# Patient Record
Sex: MALE | Race: WHITE
[De-identification: names, ages, dates, MRNs, and addresses within clinical notes are randomized per-mention and may not be internally consistent; named-entity substitution may affect disease eponyms.]

---

## 2017-11-20 ENCOUNTER — HOSPITAL ENCOUNTER (EMERGENCY)
Dept: HOSPITAL 75 - ER | Age: 42
Discharge: HOME | End: 2017-11-20
Payer: MEDICAID

## 2017-11-20 VITALS — WEIGHT: 235 LBS | HEIGHT: 69 IN | BODY MASS INDEX: 34.8 KG/M2

## 2017-11-20 VITALS — SYSTOLIC BLOOD PRESSURE: 122 MMHG | DIASTOLIC BLOOD PRESSURE: 90 MMHG

## 2017-11-20 DIAGNOSIS — I10: ICD-10-CM

## 2017-11-20 DIAGNOSIS — F41.9: ICD-10-CM

## 2017-11-20 DIAGNOSIS — J44.9: ICD-10-CM

## 2017-11-20 DIAGNOSIS — R10.84: ICD-10-CM

## 2017-11-20 DIAGNOSIS — K21.9: ICD-10-CM

## 2017-11-20 DIAGNOSIS — Z87.19: ICD-10-CM

## 2017-11-20 DIAGNOSIS — R10.13: Primary | ICD-10-CM

## 2017-11-20 DIAGNOSIS — F32.9: ICD-10-CM

## 2017-11-20 DIAGNOSIS — E86.9: ICD-10-CM

## 2017-11-20 DIAGNOSIS — E11.9: ICD-10-CM

## 2017-11-20 DIAGNOSIS — F17.210: ICD-10-CM

## 2017-11-20 DIAGNOSIS — Z82.49: ICD-10-CM

## 2017-11-20 LAB
ALBUMIN SERPL-MCNC: 4.2 GM/DL (ref 3.2–4.5)
ALT SERPL-CCNC: 29 U/L (ref 0–55)
ANION GAP SERPL CALC-SCNC: 13 MMOL/L (ref 5–14)
AST SERPL-CCNC: 20 U/L (ref 5–34)
BASOPHILS # BLD AUTO: 0 10^3/UL (ref 0–0.1)
BASOPHILS NFR BLD AUTO: 0 % (ref 0–10)
BASOPHILS NFR BLD MANUAL: 1 %
BILIRUB SERPL-MCNC: 0.4 MG/DL (ref 0.1–1)
BILIRUB UR QL STRIP: NEGATIVE
BUN SERPL-MCNC: 16 MG/DL (ref 7–18)
BUN/CREAT SERPL: 18
CALCIUM SERPL-MCNC: 9.9 MG/DL (ref 8.5–10.1)
CHLORIDE SERPL-SCNC: 98 MMOL/L (ref 98–107)
CO2 SERPL-SCNC: 26 MMOL/L (ref 21–32)
CREAT SERPL-MCNC: 0.91 MG/DL (ref 0.6–1.3)
EOSINOPHIL # BLD AUTO: 0.2 10^3/UL (ref 0–0.3)
EOSINOPHIL NFR BLD AUTO: 1 % (ref 0–10)
EOSINOPHIL NFR BLD MANUAL: 1 %
ERYTHROCYTE [DISTWIDTH] IN BLOOD BY AUTOMATED COUNT: 13.6 % (ref 10–14.5)
GFR SERPLBLD BASED ON 1.73 SQ M-ARVRAT: > 60 ML/MIN
GLUCOSE SERPL-MCNC: 106 MG/DL (ref 70–105)
KETONES UR QL STRIP: NEGATIVE
LEUKOCYTE ESTERASE UR QL STRIP: NEGATIVE
LIPASE SERPL-CCNC: 16 U/L (ref 8–78)
LYMPHOCYTES # BLD AUTO: 3.6 X 10^3 (ref 1–4)
LYMPHOCYTES NFR BLD AUTO: 23 % (ref 12–44)
MAGNESIUM SERPL-MCNC: 1.7 MG/DL (ref 1.8–2.4)
MCH RBC QN AUTO: 29 PG (ref 25–34)
MCHC RBC AUTO-ENTMCNC: 34 G/DL (ref 32–36)
MCV RBC AUTO: 85 FL (ref 80–99)
MONOCYTES # BLD AUTO: 1.1 X 10^3 (ref 0–1)
MONOCYTES NFR BLD AUTO: 7 % (ref 0–12)
NEUTROPHILS # BLD AUTO: 10.9 X 10^3 (ref 1.8–7.8)
NEUTROPHILS NFR BLD AUTO: 69 % (ref 42–75)
NEUTS BAND NFR BLD MANUAL: 70 %
NITRITE UR QL STRIP: NEGATIVE
PH UR STRIP: 6 [PH] (ref 5–9)
PLATELET # BLD: 418 10^3/UL (ref 130–400)
PMV BLD AUTO: 9.9 FL (ref 7.4–10.4)
POTASSIUM SERPL-SCNC: 3.9 MMOL/L (ref 3.6–5)
PROT SERPL-MCNC: 7.9 GM/DL (ref 6.4–8.2)
PROT UR QL STRIP: NEGATIVE
RBC # BLD AUTO: 6.03 10^6/UL (ref 4.35–5.85)
SODIUM SERPL-SCNC: 137 MMOL/L (ref 135–145)
SP GR UR STRIP: 1.01 (ref 1.02–1.02)
SQUAMOUS #/AREA URNS HPF: (no result) /HPF
UROBILINOGEN UR-MCNC: NORMAL MG/DL
VARIANT LYMPHS NFR BLD MANUAL: 12 %
VARIANT LYMPHS NFR BLD MANUAL: 6 %
WBC # BLD AUTO: 15.8 10^3/UL (ref 4.3–11)
WBC #/AREA URNS HPF: (no result) /HPF

## 2017-11-20 PROCEDURE — 85027 COMPLETE CBC AUTOMATED: CPT

## 2017-11-20 PROCEDURE — 96374 THER/PROPH/DIAG INJ IV PUSH: CPT

## 2017-11-20 PROCEDURE — 85007 BL SMEAR W/DIFF WBC COUNT: CPT

## 2017-11-20 PROCEDURE — 81000 URINALYSIS NONAUTO W/SCOPE: CPT

## 2017-11-20 PROCEDURE — 83690 ASSAY OF LIPASE: CPT

## 2017-11-20 PROCEDURE — 83735 ASSAY OF MAGNESIUM: CPT

## 2017-11-20 PROCEDURE — 36415 COLL VENOUS BLD VENIPUNCTURE: CPT

## 2017-11-20 PROCEDURE — 74177 CT ABD & PELVIS W/CONTRAST: CPT

## 2017-11-20 PROCEDURE — 80053 COMPREHEN METABOLIC PANEL: CPT

## 2017-11-20 PROCEDURE — 96375 TX/PRO/DX INJ NEW DRUG ADDON: CPT

## 2017-11-20 PROCEDURE — 80306 DRUG TEST PRSMV INSTRMNT: CPT

## 2017-11-20 NOTE — XMS REPORT
[facility]

 Created on: 2015



Dago Morrow

External Reference #: [id number]

: 1975

Sex: Male



Demographics







 Address  [address]

[place]  [address]

 

 Home Phone  [phone]

 

 Preferred Language  Unknown

 

 Marital Status  Unknown

 

 Christian Affiliation  Unknown

 

 Race  Unreported/Refused to Report

 

 Ethnic Group  Not  or 





Author







 Author  LILIANA CHURCHILL

 

 Organization  [employer]

 

 Address  Unknown

 

 Phone  Unavailable







Care Team Providers







 Care Team Member Name  Role  Phone

 

 LILIANA CHURCHILL  CP  Unavailable



                                                                



Allergies

          No Known Allergies                                                   
                                     



Problems

          





 Problem Type  Condition  Code  Onset Dates  Condition Status

 

 Problem  Essential hypertension, benign  401.1     Active

 

 Problem  Abdominal pain, generalized  789.07     Active

 

 Problem  Hematuria, unspecified  599.70     Active

 

 Problem  Other and unspecified bipolar disorders  296.89     Active

 

 Problem  Other seborrheic keratosis  702.19     Active

 

 Problem  Lumbago  724.2     Active

 

 Problem  Family history of other cardiovascular diseases  V17.49     Active

 

 Problem  Asthma, unspecified, unspecified status  493.90     Active

 

 Problem  Pain in joint, lower leg  719.46     Active

 

 Problem  Unspecified episodic mood disorder  296.90     Active

 

 Problem  Unspecified gastritis and gastroduodenitis without mention of 
hemorrhage  535.50     Active

 

 Problem  Family history of diabetes mellitus  V18.0     Active



                                                                               
                                                                               
                                        



Medications

          





 Medication  Code System  Code  Instructions  Start Date  End Date  Status  
Dosage

 

 Lorazepam  NDC  [address]-86  0.5 MG Orally Once a day  Aug 05, 2015        1 
tablet as needed



                                                                              



Results

          No Known Results                                                     
               



Summary Purpose

          [employer] Submission

## 2017-11-20 NOTE — XMS REPORT
[facility]

 Created on: 2017



Dago Morrow

External Reference #: [id number]

: 1975

Sex: Male



Demographics







 Address  [address]  [address]

 

 Preferred Language  Unknown

 

 Marital Status  Unknown

 

 Scientologist Affiliation  Unknown

 

 Race  Unknown

 

 Ethnic Group  Unknown





Author







 Author  LILIANA CHURCHILL

 

 Organization  [facility]

 

 Address  [address] [place]  [address]



 

 Phone  [phone]







Care Team Providers







 Care Team Member Name  Role  Phone

 

 ZHAOLILIANA  Unavailable  [phone]







PROBLEMS







 Type  Condition  ICD9-CM Code  SNL26-JN Code  Onset Dates  Condition Status  
SNOMED Code

 

 Problem  Mood disorder     F39     Active  12726962

 

 Problem  Anxiety disorder, unspecified     F41.9     Active  017450015

 

 Problem  Other chronic pain     G89.29     Active  57781813

 

 Problem  Type 2 diabetes mellitus with hyperglycemia     E11.65     Active  
982456673247627

 

 Problem  Type 2 diabetes mellitus with diabetic polyneuropathy     E11.42     
Active  34985518

 

 Problem  Uncontrolled type 2 diabetes mellitus without complication, without 
long-term current use of insulin     E11.65     Active  620039809

 

 Problem  Diabetic polyneuropathy associated with type 2 diabetes mellitus     
E11.42     Active  07806028

 

 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  
733404605

 

 Problem  Chronic obstructive pulmonary disease, unspecified COPD type     
J44.9     Active  77223429

 

 Problem  Anxiety     F41.9     Active  55954644

 

 Problem  Social phobia     F40.10     Active  01133258

 

 Problem  Major depression, melancholic type     F32.9     Active  031524705







ALLERGIES

Unknown Allergies



SOCIAL HISTORY

No smoking Hx information available



PLAN OF CARE





VITAL SIGNS





MEDICATIONS







 Medication  Instructions  Dosage  Frequency  Start Date  End Date  Duration  
Status

 

 Xanax 1 MG  Orally 2 times a day  1 tablet  12h  15 Aug, 2016     28 days  
Active







RESULTS

No Results



PROCEDURES

No Known procedures



IMMUNIZATIONS

No Known Immunizations

## 2017-11-20 NOTE — XMS REPORT
[facility]

 Created on: 2016



Dago Morrow

External Reference #: [id number]

: 1975

Sex: Male



Demographics







 Address  [address]

[place]  [address]

 

 Home Phone  [phone]

 

 Preferred Language  Unknown

 

 Marital Status  Unknown

 

 Anglican Affiliation  Unknown

 

 Race  White

 

 Ethnic Group  Not  or 





Author







 Author  LILIANA CHURCHILL

 

 Organization  [employer]

 

 Address  Unknown

 

 Phone  Unavailable







Care Team Providers







 Care Team Member Name  Role  Phone

 

 LILIANA CHURCHILL  CP  Unavailable



                                                                



Allergies

          No Known Allergies                                                   
                                     



Problems

          





 Problem Type  Condition  Code  Onset Dates  Condition Status

 

 Problem  Pain in joint, lower leg  719.46     Active

 

 Problem  Unspecified gastritis and gastroduodenitis without mention of 
hemorrhage  535.50     Active

 

 Problem  Family history of diabetes mellitus  V18.0     Active

 

 Problem  Uncontrolled type 2 diabetes mellitus without complication, without 
long-term current use of insulin  E11.65     Active

 

 Problem  Type 2 diabetes mellitus with diabetic polyneuropathy  E11.42     
Active

 

 Problem  Mood disorder  F39     Active

 

 Problem  Lumbago  724.2     Active

 

 Problem  Family history of other cardiovascular diseases  V17.49     Active

 

 Problem  Type 2 diabetes mellitus with hyperglycemia  E11.65     Active

 

 Problem  Asthma, unspecified, unspecified status  493.90     Active

 

 Problem  Essential hypertension, benign  401.1     Active

 

 Problem  Hematuria, unspecified  599.70     Active

 

 Problem  Other and unspecified bipolar disorders  296.89     Active

 

 Problem  Abdominal pain, generalized  789.07     Active

 

 Problem  Other seborrheic keratosis  702.19     Active

 

 Problem  Unspecified episodic mood disorder  296.90     Active



                                                                               
                                                                               
                                                                                



Medications

          





 Medication  Code System  Code  Instructions  Start Date  End Date  Status  
Dosage

 

 Xanax  NDC  [address]-65  1 MG Orally 2 times a day  Aug 15, 2016        1 
tablet



                                                                              



Results

          No Known Results                                                     
               



Summary Purpose

          [employer] Submission

## 2017-11-20 NOTE — XMS REPORT
[facility]

 Created on: 2016



Dago Morrow

External Reference #: [id number]

: 1975

Sex: Male



Demographics







 Address  [address]

[place]  [address]

 

 Home Phone  [phone]

 

 Preferred Language  Unknown

 

 Marital Status  Unknown

 

 Zoroastrianism Affiliation  Unknown

 

 Race  Unreported/Refused to Report

 

 Ethnic Group  Not  or 





Author







 Author  LILIANA CHURCHILL

 

 Organization  [employer]

 

 Address  Unknown

 

 Phone  Unavailable







Care Team Providers







 Care Team Member Name  Role  Phone

 

 LILIANA CHURCHILL  CP  Unavailable



                                                                



Allergies

          No Known Allergies                                                   
                                     



Problems

          





 Problem Type  Condition  Code  Onset Dates  Condition Status

 

 Problem  Essential hypertension, benign  401.1     Active

 

 Problem  Abdominal pain, generalized  789.07     Active

 

 Problem  Hematuria, unspecified  599.70     Active

 

 Problem  Other and unspecified bipolar disorders  296.89     Active

 

 Problem  Other seborrheic keratosis  702.19     Active

 

 Problem  Lumbago  724.2     Active

 

 Problem  Family history of other cardiovascular diseases  V17.49     Active

 

 Problem  Asthma, unspecified, unspecified status  493.90     Active

 

 Problem  Pain in joint, lower leg  719.46     Active

 

 Problem  Unspecified episodic mood disorder  296.90     Active

 

 Problem  Unspecified gastritis and gastroduodenitis without mention of 
hemorrhage  535.50     Active

 

 Problem  Family history of diabetes mellitus  V18.0     Active



                                                                               
                                                                               
                                        



Medications

          No Known Medications                                                 
                             



Results

          No Known Results                                                     
               



Summary Purpose

          [employer] Submission

## 2017-11-20 NOTE — XMS REPORT
[facility]

 Created on: 2016



Dago Morrow

External Reference #: [id number]

: 1975

Sex: Male



Demographics







 Address  [address]

[place]  [address]

 

 Home Phone  [phone]

 

 Preferred Language  Unknown

 

 Marital Status  Unknown

 

 Religion Affiliation  Unknown

 

 Race  White

 

 Ethnic Group  Not  or 





Author







 Author  LILIANA CHURCHILL

 

 [facility]  [employer]

 

 Address  Unknown

 

 Phone  Unavailable







Care Team Providers







 Care Team Member Name  Role  Phone

 

 LILIANA CHURCHILL  CP  Unavailable



                                                                



Allergies

          No Known Allergies                                                   
                                     



Problems

          





 Problem Type  Condition  Code  Onset Dates  Condition Status

 

 Problem  Pain in joint, lower leg  719.46     Active

 

 Problem  Unspecified gastritis and gastroduodenitis without mention of 
hemorrhage  535.50     Active

 

 Problem  Family history of diabetes mellitus  V18.0     Active

 

 Problem  Uncontrolled type 2 diabetes mellitus without complication, without 
long-term current use of insulin  E11.65     Active

 

 Problem  Type 2 diabetes mellitus with diabetic polyneuropathy  E11.42     
Active

 

 Problem  Mood disorder  F39     Active

 

 Problem  Lumbago  724.2     Active

 

 Problem  Family history of other cardiovascular diseases  V17.49     Active

 

 Problem  Type 2 diabetes mellitus with hyperglycemia  E11.65     Active

 

 Problem  Asthma, unspecified, unspecified status  493.90     Active

 

 Assessment  Seizures  R56.9     Active

 

 Problem  Essential hypertension, benign  401.1     Active

 

 Problem  Hematuria, unspecified  599.70     Active

 

 Problem  Other and unspecified bipolar disorders  296.89     Active

 

 Problem  Abdominal pain, generalized  789.07     Active

 

 Problem  Other seborrheic keratosis  702.19     Active

 

 Problem  Unspecified episodic mood disorder  296.90     Active



                                                                               
                                                                               
                                                                               
           



Medications

          No Known Medications                                                 
                                       



Procedures

          





 Procedure  Coding System  Code  Date

 

 VENIPUNCT, ROUTINE*  CPT-4  42277  Oct 06, 2016

 

 LAB NOT BILLED BY [facility]  CPT-4  NOBLL  Oct 06, 2016



                                                                              



Results

          





 Name  Result  Date  Reference Range  Unit  Abnormality Flag

 

 TSH               

 

 ----TSH  2.060  [id number]  0.450-4.500   uIU/mL   

 

 CBC               

 

 ----Basos  1  62809878     %   

 

 ----MCV  90  34664633  79-97   fL   

 

 ----Hematocrit  41.4  90185713  37.5-51.0   %   

 

 ----Eos  5  83895631     %   

 

 ----MCHC  32.6  14386232  31.5-35.7   g/dL   

 

 ----Monocytes  7  07023837     %   

 

 ----MCH  29.5  35636229  26.6-33.0   pg   

 

 ----Lymphs  26  88129404     %   

 

 ----Eos (Absolute)  0.5  17456738  0.0-0.4   x10E3/uL  H

 

 ----WBC  9.1  17452765  3.4-10.8   x10E3/uL   

 

 ----Monocytes(Absolute)  0.7  92640994  0.1-0.9   x10E3/uL   

 

 ----Lymphs (Absolute)  2.3  04895151  0.7-3.1   x10E3/uL   

 

 ----Hemoglobin  13.5  27151958  12.6-17.7   g/dL   

 

 ----Neutrophils (Absolute)  5.5  10627920  1.4-7.0   x10E3/uL   

 

 ----RBC  4.58  44698771  4.14-5.80   x10E6/uL   

 

 ----Immature Grans (Abs)  0.0  48242083  0.0-0.1   x10E3/uL   

 

 ----Immature Granulocytes  0  25683310     %   

 

 ----Neutrophils  61  63995141     %   

 

 ----Baso (Absolute)  0.1  59078203  0.0-0.2   x10E3/uL   

 

 ----RDW  13.6  69589352  12.3-15.4   %   

 

 ----Platelets  352  16779641  150-379   x10E3/uL   

 

 ROUTINE VENIPUNCTURE               

 

 MAGNESIUM, SERUM               

 

 ----Magnesium, Serum  1.7  47815698  1.6-2.3   mg/dL   

 

 CMP               

 

 ----Potassium, Serum  4.3  78661599  3.5-5.2   mmol/L   

 

 ----Sodium, Serum  136  90058654  134-144   mmol/L   

 

 ----BUN/Creatinine Ratio  20  71200985  9-20       

 

 ----eGFR If Africn Am  131  79077180      >59   mL/min/1.73   

 

 ----eGFR If NonAfricn Am  113  67877119      >59   mL/min/1.73   

 

 ----Creatinine, Serum  0.76  49133590  0.76-1.27   mg/dL   

 

 ----BUN  15  2016  6-24   mg/dL   

 

 ----Glucose, Serum  187  45934153  65-99   mg/dL  H

 

 ----AST (SGOT)  60  10543122  0-40   IU/L  H

 

 ----Globulin, Total  2.4  49558225  1.5-4.5   g/dL   

 

 ----ALT (SGPT)  96  40027022  0-44   IU/L  H

 

 ----A/G Ratio  1.7  18274397  1.1-2.5       

 

 ----Bilirubin, Total  0.3  77649411  0.0-1.2   mg/dL   

 

 ----Alkaline Phosphatase, S  70  44280331     IU/L   

 

 ----Carbon Dioxide, Total  25  63026048  18-29   mmol/L   

 

 ----Calcium, Serum  9.1  98092963  8.7-10.2   mg/dL   

 

 ----Protein, Total, Serum  6.5  11031406  6.0-8.5   g/dL   

 

 ----Albumin, Serum  4.1  77117890  3.5-5.5   g/dL   

 

 ----Chloride, Serum  93  99463631     mmol/L  L



                                                                               
                             



Summary Purpose

          eClinicalWorks Submission

## 2017-11-20 NOTE — XMS REPORT
[facility]

 Created on: 2017



Dago Morrow

External Reference #: [id number]

: 1975

Sex: Male



Demographics







 Address  [address] [place] [address]  [address]

 

 Preferred Language  Unknown

 

 Marital Status  Unknown

 

 Taoist Affiliation  Unknown

 

 Race  Unknown

 

 Ethnic Group  Unknown





Author







 Author  LILIANA CHURCHILL

 

 Organization  [facility]

 

 Address  [address] [place]  [address]



 

 Phone  [phone]







Care Team Providers







 Care Team Member Name  Role  Phone

 

 LILIANA CHURCHILL  Unavailable  [phone]







PROBLEMS







 Type  Condition  ICD9-CM Code  OWR81-NP Code  Onset Dates  Condition Status  
SNOMED Code

 

 Problem  Unspecified episodic mood disorder  296.90        Active  248863334

 

 Problem  Family history of diabetes mellitus  V18.0        Active  198608685

 

 Problem  Pain in joint, lower leg  719.46        Active  802390378

 

 Problem  Type 2 diabetes mellitus with diabetic polyneuropathy     E11.42     
Active  73999777

 

 Problem  Type 2 diabetes mellitus with hyperglycemia     E11.65     Active  
335896118408938

 

 Problem  Family history of other cardiovascular diseases  V17.49        Active
  623783241

 

 Problem  Unspecified gastritis and gastroduodenitis without mention of 
hemorrhage  535.50        Active  578235830

 

 Problem  Asthma, unspecified, unspecified status  493.90        Active  
26842718

 

 Problem  Lumbago  724.2        Active  434548731

 

 Assessment  Diabetic polyneuropathy associated with type 2 diabetes mellitus  
   E11.42  15 Sep, 2016  Active  02333324

 

 Problem  Other seborrheic keratosis  702.19        Active  646834769

 

 Problem  Essential hypertension, benign  401.1        Active  2850362

 

 Assessment  Carpal tunnel syndrome, right     G56.01  15 Sep, 2016  Active  
70930758

 

 Problem  Hematuria, unspecified  599.70        Active  36732827

 

 Problem  Other and unspecified bipolar disorders  296.89        Active  
58841525

 

 Problem  Abdominal pain, generalized  789.07        Active  798360330







ALLERGIES







 Substance  Reaction  Event Type  Date  Status

 

 N.K.D.A.  Unknown  Non Drug Allergy  15 Sep, 2016  Unknown







SOCIAL HISTORY

No smoking Hx information available



PLAN OF CARE





VITAL SIGNS







 Height  72 in  2016-09-15

 

 Weight  321.1 lbs  2016-09-15

 

 Heart Rate  88 bpm  2016-09-15

 

 Respiratory Rate  24   2016-09-15

 

 BMI  43.54 kg/m2  2016-09-15

 

 Blood pressure systolic  122 mmHg  2016-09-15

 

 Blood pressure diastolic  78 mmHg  2016-09-15







MEDICATIONS







 Medication  Instructions  Dosage  Frequency  Start Date  End Date  Duration  
Status

 

 MiraLax 17 gm/dose  Orally Once a day  1 capful mixed with 8 ounces of fluid  
24h  16 Dec, 2015        Active

 

 Promethazine HCl 25 MG  Orally 3 times a day PRN  1 tablet as needed           
10  Active

 

 Sucralfate 1 GM     TAKE ONE TABELT BY MOUTH  BEFORE MEALS AND AT BEDTIME     
      30  Active

 

 Cyclobenzaprine HCl 10 mg  Orally 2 times a day  1 tablet  12h        30  
Active

 

 Singulair 10 mg  Orally Once a day  1 tablet at bedtime  24h        90  Active

 

 Accu-Chek Mehnaz 1  In Vitro 2 times a day  test blood sugar  12h  
        Active

 

 Neurontin 400 MG  Orally Three times a day  1 capsule  8h        30  Active

 

 Protonix 40 mg  Orally Once a day  1 tablet  24h        90  Active

 

 Metformin HCl 1000 MG  Orally Twice a day  1 tablet with meals  12h       30 day(s)  Active

 

 Prozac 20 MG  Orally Once a day  1 capsule  24h        30  Active

 

 [place]ier 7.5-325 MG  Orally 3 times a day  1 tablet as needed  8h  27 Mar, 2015  
      Active

 

 Diclofenac Sodium 75 MG  Orally Twice a day  1 tablet  12h        90  Active

 

 Albuterol Sulfate 90 mcg/actuation     inhale 2 puffs  6h  01 Oct, 2014        
Active

 

 Lisinopril-Hydrochlorothiazide 20-25 MG     1 tablet  24h        90  Active

 

 Zofran 8 MG  Orally Once a day  1 tablet  24h        30  Active

 

 Xanax 0.5 MG  Orally 2 times a day  1 tablet  12h  15 Aug, 2016        Active







RESULTS

No Results



PROCEDURES







 Procedure  Date Ordered  Related Diagnosis  Body Site

 

 Office Visit, Est Pt., Level 3  Sept 15, 2016      







IMMUNIZATIONS

No Known Immunizations

## 2017-11-20 NOTE — XMS REPORT
[facility]

 Created on: 10/03/2017



Dago Morrow

External Reference #: [id number]

: 1975

Sex: Male



Demographics







 Address  [address]

[place]  [address]

 

 Preferred Language  Unknown

 

 Marital Status  Unknown

 

 Gnosticism Affiliation  Unknown

 

 Race  Unknown

 

 Ethnic Group  Unknown





Author







 Author  LILIANA CHURCHILL

 

 Organization  [facility]

 

 Address  [address] [place]  [address]



 

 Phone  [phone]







Care Team Providers







 Care Team Member Name  Role  Phone

 

 LILIANA CHURCHILL  Unavailable  [phone]







PROBLEMS







 Type  Condition  ICD9-CM Code  EEL08-GX Code  Onset Dates  Condition Status  
SNOMED Code

 

 Problem  Mood disorder     F39     Active  80387906

 

 Problem  Anxiety disorder, unspecified     F41.9     Active  385875644

 

 Problem  Other chronic pain     G89.29     Active  43156405

 

 Problem  Type 2 diabetes mellitus with hyperglycemia     E11.65     Active  
596124254172065

 

 Problem  Type 2 diabetes mellitus with diabetic polyneuropathy     E11.42     
Active  02698033

 

 Problem  Uncontrolled type 2 diabetes mellitus without complication, without 
long-term current use of insulin     E11.65     Active  439455493

 

 Problem  Diabetic polyneuropathy associated with type 2 diabetes mellitus     
E11.42     Active  68013409

 

 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  
502293950

 

 Problem  Chronic obstructive pulmonary disease, unspecified COPD type     
J44.9     Active  91221914

 

 Problem  Anxiety     F41.9     Active  88127586

 

 Problem  Social phobia     F40.10     Active  14840161

 

 Problem  Major depression, melancholic type     F32.9     Active  563233432







ALLERGIES

No Information



SOCIAL HISTORY

Never Assessed



PLAN OF CARE





VITAL SIGNS





MEDICATIONS







 Medication  Instructions  Dosage  Frequency  Start Date  End Date  Duration  
Status

 

 Xanax 1 MG  Orally 2 times a day  1 tablet  12h  15 Aug, 2016     28 days  
Active







RESULTS

No Results



PROCEDURES

No Known procedures



IMMUNIZATIONS

No Known Immunizations



MEDICAL (GENERAL) HISTORY







 Type  Description  Date

 

 Medical History  hypertension   

 

 Medical History  asthma   

 

 Medical History  depression and mood disorder   

 

 Medical History  chronic back pain   

 

 Surgical History  angiogram(heart cath)  2016

 

 Hospitalization History  pancreatitis  

 

 Hospitalization History  ER visit for abdominal crampintg  2015

## 2017-11-20 NOTE — XMS REPORT
[facility]

 Created on: 2015



Dago Morrow

External Reference #: [id number]

: 1975

Sex: Male



Demographics







 Address  [address]

[place]  [address]

 

 Home Phone  [phone]

 

 Preferred Language  Unknown

 

 Marital Status  Unknown

 

 [employer] Affiliation  Unknown

 

 Race  Unreported/Refused to Report

 

 Ethnic Group  Not  or 





Author







 Author  LILIANA CHURCHILL

 

 Organization  [employer]

 

 Address  Unknown

 

 Phone  Unavailable







Care Team Providers







 Care Team Member Name  Role  Phone

 

 LILIANA CHURCHILL  CP  Unavailable



                                                                



Allergies

          No Known Allergies                                                   
                                     



Problems

          





 Problem Type  Condition  ICD-9 Code  Onset Dates  Condition Status

 

 Problem  Essential hypertension, benign  401.1     Active

 

 Problem  Abdominal pain, generalized  789.07     Active

 

 Problem  Hematuria, unspecified  599.70     Active

 

 Problem  Other and unspecified bipolar disorders  296.89     Active

 

 Problem  Other seborrheic keratosis  702.19     Active

 

 Problem  Lumbago  724.2     Active

 

 Problem  Family history of other cardiovascular diseases  V17.49     Active

 

 Problem  Asthma, unspecified, unspecified status  493.90     Active

 

 Problem  Pain in joint, lower leg  719.46     Active

 

 Problem  Unspecified episodic mood disorder  296.90     Active

 

 Problem  Unspecified gastritis and gastroduodenitis without mention of 
hemorrhage  535.50     Active

 

 Problem  Family history of diabetes mellitus  V18.0     Active



                                                                               
                                                                               
                                        



Medications

          





 Medication  Code System  Code  Instructions  Start Date  End Date  Status  
Dosage

 

 Prozac  NDC  [address]-20  10 MG Orally Once a day  May 01, 2015        1 
capsule



                                                                              



Results

          No Known Results                                                     
               



Summary Purpose

          [employer] Submission

## 2017-11-20 NOTE — XMS REPORT
[facility]

 Created on: 2017



Dago Morrow

External Reference #: [id number]

: 1975

Sex: Male



Demographics







 Address  [address]  [address]

 

 Preferred Language  Unknown

 

 Marital Status  Unknown

 

 Lutheran Affiliation  Unknown

 

 Race  Unknown

 

 Ethnic Group  Unknown





Author







 Author  LILIANA CHURCHILL

 

 Organization  [facility]

 

 Address  [address] [place]  [address]



 

 Phone  [phone]







Care Team Providers







 Care Team Member Name  Role  Phone

 

 LILIANA CHURCHILL  Unavailable  [phone]







PROBLEMS







 Type  Condition  ICD9-CM Code  CZQ57-AH Code  Onset Dates  Condition Status  
SNOMED Code

 

 Problem  Mood disorder     F39     Active  64952450

 

 Problem  Anxiety disorder, unspecified     F41.9     Active  315268824

 

 Problem  Other chronic pain     G89.29     Active  16242493

 

 Problem  Type 2 diabetes mellitus with hyperglycemia     E11.65     Active  
747657105100902

 

 Problem  Type 2 diabetes mellitus with diabetic polyneuropathy     E11.42     
Active  08487693

 

 Problem  Uncontrolled type 2 diabetes mellitus without complication, without 
long-term current use of insulin     E11.65     Active  995454563

 

 Problem  Diabetic polyneuropathy associated with type 2 diabetes mellitus     
E11.42     Active  88158474

 

 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  
914225452

 

 Problem  Chronic obstructive pulmonary disease, unspecified COPD type     
J44.9     Active  11761193

 

 Problem  Anxiety     F41.9     Active  67674507

 

 Problem  Social phobia     F40.10     Active  69615289

 

 Problem  Major depression, melancholic type     F32.9     Active  061781515







ALLERGIES

No Known Allergies



SOCIAL HISTORY

Never Assessed



PLAN OF CARE







 Activity  Details









  









 Follow Up  4 Weeks Reason:mood disorder







VITAL SIGNS







 Height  72 in  2017

 

 Weight  334.0 lbs  2017

 

 Temperature  97.6 degrees Fahrenheit  2017

 

 Heart Rate  104 bpm  2017

 

 Respiratory Rate  24   2017

 

 BMI  45.29 kg/m2  2017

 

 Blood pressure systolic  129 mmHg  2017

 

 Blood pressure diastolic  72 mmHg  2017







MEDICATIONS







 Medication  Instructions  Dosage  Frequency  Start Date  End Date  Duration  
Status

 

 Albuterol Sulfate 90 mcg/actuation     inhale 2 puffs  6h  01 Oct, 2014        
Active

 

 Metformin HCl 1000 MG  Orally Twice a day  1 tablet with meals  12h        90  
Active

 

 True Metrix Meter glucometer     test blood sugar  12h  15 Sep, 2016        
Active

 

 Lisinopril-Hydrochlorothiazide 20-25 MG     1 tablet  24h        90  Active

 

 Dulera 200-5 MCG/ACT  Inhalation Twice a day  2 puffs  12h        
  Active

 

 Accu-Chek Mehnaz 1  In Vitro 2 times a day  test blood sugar  12h  
        Active

 

 Diclofenac Sodium 75 MG  Orally Twice a day  1 tablet  12h        90  Active

 

 Protonix 40 mg  Orally Once a day  1 tablet  24h        90  Active

 

 Pregabalin 75 MG  Orally Twice a day  1 capsule  12h       30 days
  Active

 

 Keflex 500 mg  Orally 4 times a day  1 capsule  6h  
  10 day(s)  Active

 

 True Metrix Blood Glucose Test -  In Vitro 2 times a day  test blood sugar  
12h  15 Sep, 2016     90 days  Active

 

 GlipiZIDE 5 mg  Orally 2 times a day  1 tablet  12h        30  Active

 

 Xanax 1 MG  Orally 2 times a day  1 tablet  12h  15 Aug, 2016     23 days  
Active

 

 Prozac 40 MG  Orally Once a day  1 capsule  24h        30 days  Active

 

 Prozac 10 mg  Orally Once a day, add to 40 mg  1 capsule in the morning             Active

 

 Treece 7.5-325 MG  Orally 3 times a day  1 tablet as needed  8h  31 May, 2017  
   28 days  Active

 

 HydrOXYzine HCl 25 MG  Orally every 8 hrs  1 tablet as needed  8h        30  
Active

 

 Singulair 10 mg  Orally Once a day  1 tablet at bedtime  24h        90  Active

 

 Cyclobenzaprine HCl 10 mg  Orally 2 times a day  1 tablet  12h        30  
Active

 

 Neurontin 800 MG  Orally Three times a day  1 capsule  8h           Active







RESULTS







 Name  Result  Date  Reference Range

 

 A1C (IN HOUSE)     2017   

 

 A1C IN HOUSE  5.8     4.3 - 5.6 %

 

 Previous A1c  6.2      

 

 Lot   [id number]      

 

 Exp date        







PROCEDURES







 Procedure  Date Ordered  Result  Body Site

 

 GLYCATED HEMOGLOBIN TEST  2017      







IMMUNIZATIONS

No Known Immunizations



MEDICAL (GENERAL) HISTORY







 Type  Description  Date

 

 Medical History  hypertension   

 

 Medical History  asthma   

 

 Medical History  depression and mood disorder   

 

 Medical History  chronic back pain   

 

 Surgical History  angiogram(heart cath)  2016

 

 Hospitalization History  pancreatitis  

 

 Hospitalization History  ER visit for abdominal crampintg  2015

## 2017-11-20 NOTE — XMS REPORT
[facility]

 Created on: 2017



Dago Morrow

External Reference #: [id number]

: 1975

Sex: Male



Demographics







 Address  [address]  [address]

 

 Preferred Language  Unknown

 

 Marital Status  Unknown

 

 Tenriism Affiliation  Unknown

 

 Race  Unknown

 

 Ethnic Group  Unknown





Author







 Author  LILIANA CHURCHILL

 

 Organization  [facility]

 

 Address  [address] [place]  [address]



 

 Phone  [phone]







Care Team Providers







 Care Team Member Name  Role  Phone

 

 LILIANA CHURCHILL  Unavailable  [phone]







PROBLEMS







 Type  Condition  ICD9-CM Code  ODX28-OP Code  Onset Dates  Condition Status  
SNOMED Code

 

 Problem  Mood disorder     F39     Active  10388221

 

 Problem  Anxiety disorder, unspecified     F41.9     Active  978125991

 

 Problem  Other chronic pain     G89.29     Active  86034462

 

 Problem  Type 2 diabetes mellitus with hyperglycemia     E11.65     Active  
930976561102874

 

 Problem  Type 2 diabetes mellitus with diabetic polyneuropathy     E11.42     
Active  16924109

 

 Problem  Uncontrolled type 2 diabetes mellitus without complication, without 
long-term current use of insulin     E11.65     Active  634294046

 

 Problem  Diabetic polyneuropathy associated with type 2 diabetes mellitus     
E11.42     Active  31656718

 

 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  
339409666

 

 Problem  Chronic obstructive pulmonary disease, unspecified COPD type     
J44.9     Active  12174896

 

 Problem  Anxiety     F41.9     Active  87208492

 

 Problem  Social phobia     F40.10     Active  94867073

 

 Problem  Major depression, melancholic type     F32.9     Active  094148770







ALLERGIES







 Substance  Reaction  Event Type  Date  Status

 

 N.K.D.A.  Unknown  Non Drug Allergy    Unknown







SOCIAL HISTORY

No smoking Hx information available



PLAN OF CARE





VITAL SIGNS







 Height  72 in  2017

 

 Weight  314.0 lbs  2017

 

 Temperature  98.1 degrees Fahrenheit  2017

 

 Heart Rate  88 bpm  2017

 

 Respiratory Rate  30   2017

 

 BMI  42.58 kg/m2  2017

 

 Blood pressure systolic  125 mmHg  2017

 

 Blood pressure diastolic  83 mmHg  2017







MEDICATIONS







 Medication  Instructions  Dosage  Frequency  Start Date  End Date  Duration  
Status

 

 Amoxicillin 500 MG  Orally every 8 hrs  1 capsule  8h    10 day(s)  Active

 

 GlipiZIDE 5 mg  Orally 2 times a day  1 tablet  12h        30  Active

 

 True Metrix Meter glucometer     test blood sugar  12h  15 Sep, 2016        
Active

 

 HydrOXYzine HCl 25 MG  Orally every 8 hrs  1 tablet as needed  8h       30 day(s)  Active

 

 True Metrix Blood Glucose Test -  In Vitro 2 times a day  as directed  12h  15 
Sep, 2016        Active

 

 Lisinopril-Hydrochlorothiazide 20-25 MG     1 tablet  24h        90  Active

 

 Diclofenac Sodium 75 MG  Orally Twice a day  1 tablet  12h        90  Active

 

 Protonix 40 mg  Orally Once a day  1 tablet  24h        90  Active

 

 Metformin HCl 1000 MG  Orally Twice a day  1 tablet with meals  12h       90 days  Active

 

 Xanax 1 MG  Orally 2 times a day  1 tablet  12h  15 Aug, 2016     28 days  
Active

 

 [place] 7.5-325 MG  Orally 3 times a day  1 tablet as needed  8h  09 2017  
   28 days  Active

 

 Cyclobenzaprine HCl 10 mg  Orally 2 times a day  1 tablet  12h        30  
Active

 

 Singulair 10 mg  Orally Once a day  1 tablet at bedtime  24h        90  Active

 

 Prozac 40 MG  Orally Once a day  1 capsule  24h        30  Active

 

 Accu-Chek Mehnaz 1  In Vitro 2 times a day  test blood sugar  12h  
        Active

 

 Neurontin 800 MG  Orally Three times a day  1 capsule  8h        30  Active

 

 Albuterol Sulfate 90 mcg/actuation     inhale 2 puffs  6h  01 Oct, 2014        
Active

 

 Promethazine HCl 25 MG  Orally 3 times a day PRN  1 tablet as needed           
10  Active







RESULTS







 Name  Result  Date  Reference Range

 

 A1C (IN HOUSE)     2017   

 

 A1C IN HOUSE  6.2     4.3 - 5.6 %

 

 Previous A1c  7.1      

 

 Lot   [id number]      

 

 Exp date        

 

 MICROALBUMIN, URINE (IN HOUSE)     2017   

 

 MICROALBUMIN  Normal      

 

 Lot #  [id number]      

 

 Exp date        

 

 Clarity  Clear      

 

 Color  Yellow      

 

 ALB  10      

 

 CRE  100      

 

 A:C (IN HOUSE)  <30      

 

 Control  +      

 

 Control  -      

 

 Lot #  [id number]      

 

 Exp date  2017      

 

 CMP     2017   

 

 Glucose, Serum  75     65-99

 

 BUN  14     6-24

 

 Creatinine, Serum  0.80     0.76-1.27

 

 eGFR If NonAfricn Am  111         >59

 

 eGFR If Africn Am  128         >59

 

 BUN/Creatinine Ratio  18     9-20

 

 Sodium, Serum  137     134-144

 

 Potassium, Serum  4.4     3.5-5.2

 

 Chloride, Serum  94     

 

 Carbon Dioxide, Total  26     18-29

 

 Calcium, Serum  10.0     8.7-10.2

 

 Protein, Total, Serum  7.4     6.0-8.5

 

 Albumin, Serum  4.4     3.5-5.5

 

 Globulin, Total  3.0     1.5-4.5

 

 A/G Ratio  1.5     1.1-2.5

 

 Bilirubin, Total  <0.2     0.0-1.2

 

 Alkaline Phosphatase, S  78     

 

 AST (SGOT)  27     0-40

 

 ALT (SGPT)  48     0-44

 

 [employer]     2017   

 

 Xray : Knee, Left 1-2 views (IN HOUSE)     2017   







PROCEDURES







 Procedure  Date Ordered  Related Diagnosis  Body Site

 

 GLYCATED HEMOGLOBIN TEST  2017      

 

 MICROALBUMIN, SEMIQUANT  2017      

 

 VENIPUNCT, ROUTINE*  2017      

 

 Office Visit, Est Pt., Level 3  2017      

 

 [facility]  2017      

 

 FLUARIX QUAD P-FREE 3 AND UP .50 2016      

 

 No Charge  2017      

 

 X-RAY EXAM OF KNEE, 1 OR 2  2017      

 

 SINGLE IMMUNIZATION ADMIN  2017      







IMMUNIZATIONS







 Vaccine  Route  Administration Date  Status

 

 FLUARIX QUAD P-FREE 3 AND UP .50   IM Intramuscular  2017  
Administered

## 2017-11-20 NOTE — DIAGNOSTIC IMAGING REPORT
PROCEDURE: CT abdomen and pelvis with contrast.



TECHNIQUE: Multiple contiguous axial images were obtained through

the abdomen and pelvis after administration of intravenous

contrast. 



INDICATION:  Upper abdominal pain.



100 ML of Omnipaque 350 is administered intravenously.



FINDINGS:

The lung bases appear clear.



The liver demonstrate diffuse hepatic steatosis. The gallbladder

has no calcified stones. The spleen, the adrenal glands and the

pancreas appear unremarkable. Kidneys demonstrate  focal

hypodensities less than a centimeter in size, too small to

accurately characterize. No hydronephrosis.



The abdominal aorta is normal in caliber. No para-aortic

significantly enlarged lymph node is seen.



There is no bowel obstruction. The appendix is not identified.



There is a small amount of fat distending the left inguinal canal

suggestive of a small indirect inguinal hernia. There is also a

tiny fat-containing umbilical hernia.



No significant free fluid or fluid collection in the abdomen or

pelvis is seen.



The osseous structures demonstrate mild degenerative change.



IMPRESSION:



1. There are small fat-containing left inguinal and tiny

umbilical hernias.

2. Hepatic steatosis.



Dictated by: 



  Dictated on workstation # [id number]

## 2017-11-20 NOTE — XMS REPORT
[facility]

 Created on: 10/14/2015



Dago Morrow

External Reference #: [id number]

: 1975

Sex: Male



Demographics







 Address  [address]

[place]  [address]

 

 Home Phone  [phone]

 

 Preferred Language  Unknown

 

 Marital Status  Unknown

 

 Tenriism Affiliation  Unknown

 

 Race  Unreported/Refused to Report

 

 Ethnic Group  Not  or 





Author







 Author  LILIANA CHURCHILL

 

 [facility]  [employer]

 

 Address  Unknown

 

 Phone  Unavailable







Care Team Providers







 Care Team Member Name  Role  Phone

 

 LILIANA CHURCHILL  CP  Unavailable



                                                                



Allergies

          No Known Allergies                                                   
                                     



Problems

          





 Problem Type  Condition  Code  Onset Dates  Condition Status

 

 Problem  Essential hypertension, benign  401.1     Active

 

 Problem  Abdominal pain, generalized  789.07     Active

 

 Problem  Hematuria, unspecified  599.70     Active

 

 Problem  Other and unspecified bipolar disorders  296.89     Active

 

 Problem  Other seborrheic keratosis  702.19     Active

 

 Problem  Lumbago  724.2     Active

 

 Problem  Family history of other cardiovascular diseases  V17.49     Active

 

 Problem  Asthma, unspecified, unspecified status  493.90     Active

 

 Problem  Pain in joint, lower leg  719.46     Active

 

 Problem  Unspecified episodic mood disorder  296.90     Active

 

 Problem  Unspecified gastritis and gastroduodenitis without mention of 
hemorrhage  535.50     Active

 

 Problem  Family history of diabetes mellitus  V18.0     Active



                                                                               
                                                                               
                                        



Medications

          





 Medication  Code System  Code  Instructions  Start Date  End Date  Status  
Dosage

 

 Norco  NDC  [address]-91  7.5-325 MG Orally   2015        take 1 
tablet by Oral route 2 times per day as needed for pain  prn



                                                                              



Results

          No Known Results                                                     
               



Summary Purpose

          [employer] Submission

## 2017-11-20 NOTE — XMS REPORT
[facility]

 Created on: 2016



Dago Morrow

External Reference #: [id number]

: 1975

Sex: Male



Demographics







 Address  [address]

[place]  [address]

 

 Home Phone  [phone]

 

 Preferred Language  Unknown

 

 Marital Status  Unknown

 

 [employer]ats[employer] Affiliation  Unknown

 

 Race  Unreported/Refused to Report

 

 Ethnic Group  Not  or 





Author







 Author  LILIANA CHURCHILL

 

 Organization  [employer]

 

 Address  Unknown

 

 Phone  Unavailable







Care Team Providers







 Care Team Member Name  Role  Phone

 

 LILIANA CHURCHILL  CP  Unavailable



                                                                



Allergies

          No Known Allergies                                                   
                                     



Problems

          





 Problem Type  Condition  Code  Onset Dates  Condition Status

 

 Problem  Essential hypertension, benign  401.1     Active

 

 Problem  Abdominal pain, generalized  789.07     Active

 

 Problem  Hematuria, unspecified  599.70     Active

 

 Problem  Other and unspecified bipolar disorders  296.89     Active

 

 Problem  Other seborrheic keratosis  702.19     Active

 

 Problem  Lumbago  724.2     Active

 

 Problem  Family history of other cardiovascular diseases  V17.49     Active

 

 Problem  Asthma, unspecified, unspecified status  493.90     Active

 

 Problem  Pain in joint, lower leg  719.46     Active

 

 Problem  Unspecified episodic mood disorder  296.90     Active

 

 Problem  Unspecified gastritis and gastroduodenitis without mention of 
hemorrhage  535.50     Active

 

 Problem  Family history of diabetes mellitus  V18.0     Active



                                                                               
                                                                               
                                        



Medications

          No Known Medications                                                 
                             



Results

          No Known Results                                                     
               



Summary Purpose

          [employer] Submission

## 2017-11-20 NOTE — XMS REPORT
[facility]

 Created on: 10/29/2017



Dago Morrow

External Reference #: [id number]

: 1975

Sex: Male



Demographics







 Address  [address]  [address]

 

 Preferred Language  Unknown

 

 Marital Status  Unknown

 

 Mormon Affiliation  Unknown

 

 Race  Unknown

 

 Ethnic Group  Unknown





Author







 Author  LILIANA CHURCHILL

 

 Organization  [facility]

 

 Address  [address] [place]  [address]



 

 Phone  [phone]







Care Team Providers







 Care Team Member Name  Role  Phone

 

 LILIANA CHURCHILL  Unavailable  [phone]







PROBLEMS







 Type  Condition  ICD9-CM Code  UDE54-FO Code  Onset Dates  Condition Status  
SNOMED Code

 

 Problem  Mood disorder     F39     Active  24618965

 

 Problem  Anxiety disorder, unspecified     F41.9     Active  865027169

 

 Problem  Other chronic pain     G89.29     Active  07528069

 

 Problem  Type 2 diabetes mellitus with hyperglycemia     E11.65     Active  
445706837988334

 

 Problem  Type 2 diabetes mellitus with diabetic polyneuropathy     E11.42     
Active  15805694

 

 Problem  Uncontrolled type 2 diabetes mellitus without complication, without 
long-term current use of insulin     E11.65     Active  119165495

 

 Problem  Diabetic polyneuropathy associated with type 2 diabetes mellitus     
E11.42     Active  15927687

 

 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  
682125915

 

 Problem  Chronic obstructive pulmonary disease, unspecified COPD type     
J44.9     Active  76577119

 

 Problem  Anxiety     F41.9     Active  17884562

 

 Problem  Social phobia     F40.10     Active  16211358

 

 Problem  Major depression, melancholic type     F32.9     Active  912747057







ALLERGIES

No Information



SOCIAL HISTORY

Never Assessed



PLAN OF CARE





VITAL SIGNS





MEDICATIONS







 Medication  Instructions  Dosage  Frequency  Start Date  End Date  Duration  
Status

 

 Xanax 1 MG  Orally 2 times a day  1 tablet  12h  15 Aug, 2016     23 days  
Active







RESULTS

No Results



PROCEDURES

No Known procedures



IMMUNIZATIONS

No Known Immunizations



MEDICAL (GENERAL) HISTORY







 Type  Description  Date

 

 Medical History  hypertension   

 

 Medical History  asthma   

 

 Medical History  depression and mood disorder   

 

 Medical History  chronic back pain   

 

 Surgical History  angiogram(heart cath)  2016

 

 Hospitalization History  pancreatitis  

 

 Hospitalization History  ER visit for abdominal crampintg  2015

## 2017-11-20 NOTE — XMS REPORT
[facility]

 Created on: 2016



Dago Morrow

External Reference #: [id number]

: 1975

Sex: Male



Demographics







 Address  [address]

[place]  [address]

 

 Home Phone  [phone]

 

 Preferred Language  Unknown

 

 Marital Status  Unknown

 

 Adventism Affiliation  Unknown

 

 Race  White

 

 Ethnic Group  Not  or 





Author







 Author  LILIANA CHURCHILL

 

 Organization  [employer]

 

 Address  Unknown

 

 Phone  Unavailable







Care Team Providers







 Care Team Member Name  Role  Phone

 

 LILIANA CHURCHILL  CP  Unavailable



                                                                



Allergies

          No Known Allergies                                                   
                                     



Problems

          





 Problem Type  Condition  Code  Onset Dates  Condition Status

 

 Problem  Pain in joint, lower leg  719.46     Active

 

 Problem  Unspecified gastritis and gastroduodenitis without mention of 
hemorrhage  535.50     Active

 

 Problem  Family history of diabetes mellitus  V18.0     Active

 

 Problem  Uncontrolled type 2 diabetes mellitus without complication, without 
long-term current use of insulin  E11.65     Active

 

 Problem  Type 2 diabetes mellitus with diabetic polyneuropathy  E11.42     
Active

 

 Problem  Mood disorder  F39     Active

 

 Problem  Lumbago  724.2     Active

 

 Problem  Family history of other cardiovascular diseases  V17.49     Active

 

 Problem  Type 2 diabetes mellitus with hyperglycemia  E11.65     Active

 

 Problem  Asthma, unspecified, unspecified status  493.90     Active

 

 Problem  Essential hypertension, benign  401.1     Active

 

 Problem  Hematuria, unspecified  599.70     Active

 

 Problem  Other and unspecified bipolar disorders  296.89     Active

 

 Problem  Abdominal pain, generalized  789.07     Active

 

 Problem  Other seborrheic keratosis  702.19     Active

 

 Problem  Unspecified episodic mood disorder  296.90     Active



                                                                               
                                                                               
                                                                                



Medications

          No Known Medications                                                 
                             



Results

          No Known Results                                                     
               



Summary Purpose

          [employer]Clinical[employer] Submission

## 2017-11-20 NOTE — XMS REPORT
[facility]

 Created on: 2016



Dago Mrorow

External Reference #: [id number]

: 1975

Sex: Male



Demographics







 Address  [address]

[place]  [address]

 

 Home Phone  [phone]

 

 Preferred Language  Unknown

 

 Marital Status  Unknown

 

 Confucianism Affiliation  Unknown

 

 Race  Unreported/Refused to Report

 

 Ethnic Group  Not  or 





Author







 Author  LILIANA CHURCHILL

 

 Organization  [employer]

 

 Address  Unknown

 

 Phone  Unavailable







Care Team Providers







 Care Team Member Name  Role  Phone

 

 LILIANA CHURCHILL  CP  Unavailable



                                                                



Allergies

          No Known Allergies                                                   
                                     



Problems

          





 Problem Type  Condition  Code  Onset Dates  Condition Status

 

 Problem  Essential hypertension, benign  401.1     Active

 

 Problem  Abdominal pain, generalized  789.07     Active

 

 Problem  Hematuria, unspecified  599.70     Active

 

 Problem  Lumbago  724.2     Active

 

 Problem  Family history of other cardiovascular diseases  V17.49     Active

 

 Problem  Asthma, unspecified, unspecified status  493.90     Active

 

 Problem  Pain in joint, lower leg  719.46     Active

 

 Problem  Unspecified episodic mood disorder  296.90     Active

 

 Problem  Unspecified gastritis and gastroduodenitis without mention of 
hemorrhage  535.50     Active

 

 Problem  Family history of diabetes mellitus  V18.0     Active

 

 Assessment  Chest pain, unspecified type  R07.9     Active

 

 Problem  Other and unspecified bipolar disorders  296.89     Active

 

 Problem  Other seborrheic keratosis  702.19     Active



                                                                               
                                                                               
                                                  



Medications

          No Known Medications                                                 
                                       



Procedures

          





 Procedure  Coding System  Code  Date

 

 COMPLETE CBC W/AUTO DIFF WBC  CPT-4  67980  2016

 

 NATRIURETIC PEPTIDE  CPT-4  52782  2016

 

 ASSAY THYROID STIM HORMONE  CPT-4  57171  2016

 

 COMPREHEN METABOLIC PANEL  CPT-4  49190  2016

 

 LIPID PANEL  CPT-4  89416  2016

 

 VENIPUNCT, ROUTINE*  CPT-4  84575  2016



                                                                               
                                       



Results

          





 Name  Result  Date  Reference Range  Unit  Abnormality Flag

 

 ROUTINE VENIPUNCTURE               



                                                                    



Summary Purpose

          [employer] Submission

## 2017-11-20 NOTE — XMS REPORT
[facility]

 Created on: 10/29/2016



Dago Morrow

External Reference #: [id number]

: 1975

Sex: Male



Demographics







 Address  [address]

[place]  [address]

 

 Home Phone  [phone]

 

 Preferred Language  Unknown

 

 Marital Status  Unknown

 

 Jewish Affiliation  Unknown

 

 Race  White

 

 Ethnic Group  Not  or 





Author







 LILIANA Street

 

 [facility]  [employer]

 

 Address  Unknown

 

 Phone  Unavailable







Care Team Providers







 Care Team Member Name  Role  Phone

 

 LILIANA CHURCHILL  CP  Unavailable



                                                                



Allergies, Adverse Reactions, Alerts

          





 Substance  Reaction  Event Type

 

 N.K.D.A.  Info Not Available  Non Drug Allergy



                                                                               
         



Problems

          





 Problem Type  Condition  Code  Onset Dates  Condition Status

 

 Problem  Pain in joint, lower leg  719.46     Active

 

 Problem  Unspecified gastritis and gastroduodenitis without mention of 
hemorrhage  535.50     Active

 

 Problem  Family history of diabetes mellitus  V18.0     Active

 

 Problem  Uncontrolled type 2 diabetes mellitus without complication, without 
long-term current use of insulin  E11.65     Active

 

 Problem  Type 2 diabetes mellitus with diabetic polyneuropathy  E11.42     
Active

 

 Problem  Mood disorder  F39     Active

 

 Problem  Lumbago  724.2     Active

 

 Problem  Family history of other cardiovascular diseases  V17.49     Active

 

 Problem  Type 2 diabetes mellitus with hyperglycemia  E11.65     Active

 

 Problem  Asthma, unspecified, unspecified status  493.90     Active

 

 Assessment  Uncontrolled type 2 diabetes mellitus without complication, 
without long-term current use of insulin  E11.65     Active

 

 Assessment  Mood disorder  F39     Active

 

 Problem  Essential hypertension, benign  401.1     Active

 

 Problem  Hematuria, unspecified  599.70     Active

 

 Problem  Other and unspecified bipolar disorders  296.89     Active

 

 Problem  Abdominal pain, generalized  789.07     Active

 

 Problem  Other seborrheic keratosis  702.19     Active

 

 Problem  Unspecified episodic mood disorder  296.90     Active



                                                                               
                                                                               
                                                                               
                     



Medications

          





 Medication  Code System  Code  Instructions  Start Date  End Date  Status  
Dosage

 

 True Metrix Meter  NDC  0  glucometer  2 times a day  Sept 15, 2016        
test blood sugar

 

 Lisinopril-Hydrochlorothiazide  NDC  85692272042  20-25 MG  Once a day        
   1 tablet

 

 Metformin HCl  NDC  [address]-66  1000 MG Orally Twice a day  2016  
      1 tablet with meals

 

 Promethazine HCl  NDC  95578418539  25 MG Orally 3 times a day PRN           1 
tablet as needed

 

 Singulair  NDC  26999864245  10 mg Orally Once a day           1 tablet at 
bedtime

 

 Prozac  NDC  [address]-54  20 MG Orally Once a day           1 capsule

 

 Diclofenac Sodium  NDC  21084616348  75 MG Orally Twice a day           1 
tablet

 

 Protonix  NDC  84373818621  40 mg Orally Once a day           1 tablet

 

 Neurontin  NDC  [address]-06  800 MG Orally Three times a day           1 
capsule

 

 Xanax  NDC  [address]-81  1 MG Orally 2 times a day  Aug 15, 2016        1 
tablet

 

 Albuterol Sulfate  NDC  [address]-06  90 mcg/actuation  every 6 hrs  Oct 01, 
2014        inhale 2 puffs

 

 Cyclobenzaprine HCl  NDC  [address]-61  10 mg Orally 2 times a day           
1 tablet

 

 True Metrix Blood Glucose Test  NDC  8528-739493  - In Vitro 2 times a day  
Sept 15, 2016        as directed

 

 Norco  NDC  [address]-96  7.5-325 MG Orally 3 times a day  2015    
    1 tablet as needed

 

 Accu-Chek Mehnaz  NDC  0  1 In Vitro 2 times a day  2016        test 
blood sugar

 

 GlipiZIDE  NDC  [address]-84  5 mg Orally 2 times a day  Oct 27, 2016        
1 tablet

 

 Prozac  NDC  [address]-08  40 MG Orally Once a day           1 capsule



                                                                               
                                                                               
                                                                               
           



Procedures

          





 Procedure  Coding System  Code  Date

 

 Office Visit, Est Pt., Level 3  CPT-4  96268  Oct 27, 2016



                                                                               
                   



Vital Signs

          





 Date/Time:  Oct 27, 2016

 

 Cardiac Monitoring Heart Rate  104 bpm

 

 Weight  320 lbs

 

 Height  72 in

 

 BMI  43.40 Index

 

 Blood Pressure Diastolic  74 mmHg

 

 Blood Pressure Systolic  130 mmHg



                                                                              



Results

          No Known Results                                                     
               



Summary Purpose

          [employer] Submission

## 2017-11-20 NOTE — XMS REPORT
[facility]

 Created on: 10/06/2017



Dago Morrow

External Reference #: [id number]

: 1975

Sex: Male



Demographics







 Address  [address]  [address]

 

 Preferred Language  Unknown

 

 Marital Status  Unknown

 

 Church Affiliation  Unknown

 

 Race  Unknown

 

 Ethnic Group  Unknown





Author







 Author  LILIANA CHURCHILL

 

 Organization  [facility]

 

 Address  [address] [place]  [address]



 

 Phone  [phone]







Care Team Providers







 Care Team Member Name  Role  Phone

 

 LILIANA CHURCHILL  Unavailable  [phone]







PROBLEMS







 Type  Condition  ICD9-CM Code  YWB32-YP Code  Onset Dates  Condition Status  
SNOMED Code

 

 Problem  Mood disorder     F39     Active  50380411

 

 Problem  Anxiety disorder, unspecified     F41.9     Active  670810235

 

 Problem  Other chronic pain     G89.29     Active  50149223

 

 Problem  Type 2 diabetes mellitus with hyperglycemia     E11.65     Active  
770086726642692

 

 Problem  Type 2 diabetes mellitus with diabetic polyneuropathy     E11.42     
Active  57130272

 

 Problem  Uncontrolled type 2 diabetes mellitus without complication, without 
long-term current use of insulin     E11.65     Active  275363035

 

 Problem  Diabetic polyneuropathy associated with type 2 diabetes mellitus     
E11.42     Active  76817587

 

 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  
592699465

 

 Problem  Chronic obstructive pulmonary disease, unspecified COPD type     
J44.9     Active  00806853

 

 Problem  Anxiety     F41.9     Active  05459223

 

 Problem  Social phobia     F40.10     Active  43076108

 

 Problem  Major depression, melancholic type     F32.9     Active  414861054







ALLERGIES

No Known Allergies



SOCIAL HISTORY

Never Assessed



PLAN OF CARE





VITAL SIGNS







 Height  72 in  2017

 

 Weight  307.7 lbs  2017

 

 Temperature  97.2 degrees Fahrenheit  2017

 

 Heart Rate  94 bpm  2017

 

 Respiratory Rate  20   2017

 

 BMI  41.73 kg/m2  2017

 

 Blood pressure systolic  138 mmHg  2017

 

 Blood pressure diastolic  82 mmHg  2017







MEDICATIONS







 Medication  Instructions  Dosage  Frequency  Start Date  End Date  Duration  
Status

 

 True Metrix Meter glucometer     test blood sugar  12h  15 Sep, 2016        
Active

 

 Diclofenac Sodium 75 MG  Orally Twice a day  1 tablet  12h        90  Active

 

 Prozac 40 MG  Orally Once a day  1 capsule  24h        30  Active

 

 Metformin HCl 1000 MG  Orally Twice a day  1 tablet with meals  12h  23 2016     90 days  Active

 

 HydrOXYzine HCl 25 MG  Orally every 8 hrs  1 tablet as needed  8h  26 2017     30 day(s)  Active

 

 Albuterol Sulfate 90 mcg/actuation     inhale 2 puffs  6h  01 Oct, 2014        
Active

 

 Neurontin 800 MG  Orally Three times a day  1 capsule  8h        90  Active

 

 [place] 7.5-325 MG  Orally 3 times a day  1 tablet as needed  8h  08 Mar, 2017  
      Active

 

 Singulair 10 mg  Orally Once a day  1 tablet at bedtime  24h        90  Active

 

 Protonix 40 mg  Orally Once a day  1 tablet  24h        90  Active

 

 Promethazine HCl 25 MG  Orally 3 times a day PRN  1 tablet as needed           
10  Active

 

 Cyclobenzaprine HCl 10 mg  Orally 2 times a day  1 tablet  12h        30  
Active

 

 True Metrix Blood Glucose Test -  In Vitro 2 times a day  test blood sugar  
12h  15 Sep, 2016     90 days  Active

 

 Zofran 8 MG  Orally 3 times a day  1 tablet  8h  17 Mar, 2017        Active

 

 Accu-Chek Mehnaz 1  In Vitro 2 times a day  test blood sugar  12h  
        Active

 

 Xanax 1 MG  Orally 2 times a day  1 tablet  12h  15 Aug, 2016     28 days  
Active

 

 Lisinopril-Hydrochlorothiazide 20-25 MG     1 tablet  24h        90  Active

 

 GlipiZIDE 5 mg  Orally 2 times a day  1 tablet  12h        30  Active







RESULTS

No Results



PROCEDURES

No Known procedures



IMMUNIZATIONS

No Known Immunizations



MEDICAL (GENERAL) HISTORY







 Type  Description  Date

 

 Medical History  hypertension   

 

 Medical History  asthma   

 

 Medical History  depression and mood disorder   

 

 Medical History  chronic back pain   

 

 Surgical History  angiogram(heart cath)  2016

 

 Hospitalization History  pancreatitis  

 

 Hospitalization History  ER visit for abdominal crampintg  2015

## 2017-11-20 NOTE — XMS REPORT
[facility]

 Created on: 10/01/2017



Dago Morrow

External Reference #: [id number]

: 1975

Sex: Male



Demographics







 Address  [address]  [address]

 

 Preferred Language  Unknown

 

 Marital Status  Unknown

 

 Pentecostal Affiliation  Unknown

 

 Race  Unknown

 

 Ethnic Group  Unknown





Author







 Author  LILIANA CHURCHILL

 

 Organization  [facility]

 

 Address  [address] [place]  [address]



 

 Phone  [phone]







Care Team Providers







 Care Team Member Name  Role  Phone

 

 ZHAOLILIANA  Unavailable  [phone]







PROBLEMS







 Type  Condition  ICD9-CM Code  PRG66-YJ Code  Onset Dates  Condition Status  
SNOMED Code

 

 Problem  Mood disorder     F39     Active  55687424

 

 Problem  Anxiety disorder, unspecified     F41.9     Active  499840579

 

 Problem  Other chronic pain     G89.29     Active  65610486

 

 Problem  Type 2 diabetes mellitus with hyperglycemia     E11.65     Active  
540253406453006

 

 Problem  Type 2 diabetes mellitus with diabetic polyneuropathy     E11.42     
Active  03814346

 

 Problem  Uncontrolled type 2 diabetes mellitus without complication, without 
long-term current use of insulin     E11.65     Active  152336829

 

 Problem  Diabetic polyneuropathy associated with type 2 diabetes mellitus     
E11.42     Active  22119056

 

 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  
617660674

 

 Problem  Chronic obstructive pulmonary disease, unspecified COPD type     
J44.9     Active  99931893

 

 Problem  Anxiety     F41.9     Active  01221922

 

 Problem  Social phobia     F40.10     Active  50429144

 

 Problem  Major depression, melancholic type     F32.9     Active  582789459







ALLERGIES

No Information



SOCIAL HISTORY

Never Assessed



PLAN OF CARE





VITAL SIGNS





MEDICATIONS







 Medication  Instructions  Dosage  Frequency  Start Date  End Date  Duration  
Status

 

 Norco 7.5-325 MG  Orally 3 times a day  1 tablet as needed  8h  08 Mar, 2017  
      Active







RESULTS

No Results



PROCEDURES

No Known procedures



IMMUNIZATIONS

No Known Immunizations



MEDICAL (GENERAL) HISTORY







 Type  Description  Date

 

 Medical History  hypertension   

 

 Medical History  asthma   

 

 Medical History  depression and mood disorder   

 

 Medical History  chronic back pain   

 

 Surgical History  angiogram(heart cath)  2016

 

 Hospitalization History  pancreatitis  

 

 Hospitalization History  ER visit for abdominal crampintg  2015

## 2017-11-20 NOTE — XMS REPORT
[facility]

 Created on: 2017



Dago Morrow

External Reference #: [id number]

: 1975

Sex: Male



Demographics







 Address  [address]  [address]

 

 Preferred Language  Unknown

 

 Marital Status  Unknown

 

 Gnosticist Affiliation  Unknown

 

 Race  Unknown

 

 Ethnic Group  Unknown





Author







 Author  LILIANA CHURCHILL

 

 Organization  [facility]

 

 Address  [address] [place]  [address]



 

 Phone  [phone]







Care Team Providers







 Care Team Member Name  Role  Phone

 

 LILIANA CHURCHILL  Unavailable  [phone]







PROBLEMS







 Type  Condition  ICD9-CM Code  NAL85-JK Code  Onset Dates  Condition Status  
SNOMED Code

 

 Problem  Unspecified episodic mood disorder  296.90        Active  084483383

 

 Problem  Family history of diabetes mellitus  V18.0        Active  637468708

 

 Problem  Pain in joint, lower leg  719.46        Active  947966367

 

 Problem  Type 2 diabetes mellitus with diabetic polyneuropathy     E11.42     
Active  27911628

 

 Problem  Type 2 diabetes mellitus with hyperglycemia     E11.65     Active  
562725688825780

 

 Problem  Family history of other cardiovascular diseases  V17.49        Active
  916621398

 

 Problem  Unspecified gastritis and gastroduodenitis without mention of 
hemorrhage  535.50        Active  859089485

 

 Problem  Asthma, unspecified, unspecified status  493.90        Active  
59009807

 

 Problem  Lumbago  724.2        Active  480176886

 

 Problem  Other seborrheic keratosis  702.19        Active  121747184

 

 Problem  Essential hypertension, benign  401.1        Active  5514086

 

 Assessment  Type 2 diabetes mellitus with hyperglycemia     E11.65  15 Sep, 
2016  Active  616919633172233

 

 Problem  Hematuria, unspecified  599.70        Active  62638741

 

 Problem  Other and unspecified bipolar disorders  296.89        Active  
05352894

 

 Problem  Abdominal pain, generalized  789.07        Active  934525995







ALLERGIES

Unknown Allergies



SOCIAL HISTORY

No smoking Hx information available



PLAN OF CARE





VITAL SIGNS





MEDICATIONS







 Medication  Instructions  Dosage  Frequency  Start Date  End Date  Duration  
Status

 

 True Metrix Meter glucometer     test blood sugar  12h  15 Sep, 2016        
Active

 

 True Metrix Blood Glucose Test -  In Vitro 2 times a day  as directed  12h  15 
Sep, 2016        Active







RESULTS

No Results



PROCEDURES

No Known procedures



IMMUNIZATIONS

No Known Immunizations

## 2017-11-20 NOTE — XMS REPORT
[facility]

 Created on: 2017



Dago Morrow

External Reference #: [id number]

: 1975

Sex: Male



Demographics







 Address  [address]  [address]

 

 Preferred Language  Unknown

 

 Marital Status  Unknown

 

 Shinto Affiliation  Unknown

 

 Race  Unknown

 

 Ethnic Group  Unknown





Author







 Author  LILIANA CHURCHILL

 

 Organization  [facility]

 

 Address  [address] [place]  [address]



 

 Phone  [phone]







Care Team Providers







 Care Team Member Name  Role  Phone

 

 LILIANA CHURCHILL  Unavailable  [phone]







PROBLEMS







 Type  Condition  ICD9-CM Code  VMZ65-IQ Code  Onset Dates  Condition Status  
SNOMED Code

 

 Problem  Mood disorder     F39     Active  00176171

 

 Problem  Anxiety disorder, unspecified     F41.9     Active  229912681

 

 Problem  Other chronic pain     G89.29     Active  32595705

 

 Problem  Type 2 diabetes mellitus with hyperglycemia     E11.65     Active  
149012862497428

 

 Problem  Type 2 diabetes mellitus with diabetic polyneuropathy     E11.42     
Active  43483465

 

 Problem  Uncontrolled type 2 diabetes mellitus without complication, without 
long-term current use of insulin     E11.65     Active  014671541

 

 Problem  Diabetic polyneuropathy associated with type 2 diabetes mellitus     
E11.42     Active  06427018

 

 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  
489987264

 

 Problem  Chronic obstructive pulmonary disease, unspecified COPD type     
J44.9     Active  63428083

 

 Problem  Anxiety     F41.9     Active  79084554

 

 Problem  Social phobia     F40.10     Active  56230599

 

 Problem  Major depression, melancholic type     F32.9     Active  475699228







ALLERGIES

No Information



SOCIAL HISTORY

Never Assessed



PLAN OF CARE





VITAL SIGNS





MEDICATIONS







 Medication  Instructions  Dosage  Frequency  Start Date  End Date  Duration  
Status

 

 Xanax 1 MG  Orally 2 times a day  1 tablet  12h  15 Aug, 2016     28 days  
Active

 

 [place] 7.5-325 MG  Orally 3 times a day  1 tablet as needed  8h  08 2017  
   28 days  Active







RESULTS

No Results



PROCEDURES

No Known procedures



IMMUNIZATIONS

No Known Immunizations



MEDICAL (GENERAL) HISTORY







 Type  Description  Date

 

 Medical History  hypertension   

 

 Medical History  asthma   

 

 Medical History  depression and mood disorder   

 

 Medical History  chronic back pain   

 

 Surgical History  angiogram(heart cath)  2016

 

 Hospitalization History  pancreatitis  

 

 Hospitalization History  ER visit for abdominal crampintg  2015

## 2017-11-20 NOTE — ED ABDOMINAL PAIN
General


Chief Complaint:  Abdominal/GI Problems


Stated Complaint:  ABD PAIN,FATIGUE,WEAKNESS


Source of Information:  Patient, Family


Exam Limitations:  No Limitations


 (ANGELO CUELLAR)





History of Present Illness


Time Seen By Provider:  10:53


Initial Comments


Patient has ER by private conveyance with chief complaint of 3 days 

progressively worsening epigastric abdominal pain that is diffuse be radiating. 

He has no shortness of breath or pain on inspiration nor cough above baseline. 

He doesn't history of pancreatitis but has not drank in several years. He's had 

a history of ulcers and had a scope approximate 5 years ago which showed ulcer 

disease. He had about limit today that was regular, formed without black tarry 

or bloody appearance. He has no nausea or vomiting. He does have a history of 

diabetes but is not on insulin. He is not on anything for hypercholesterolemia. 

He has lost about 100 pounds in the past year he says by taking better care of 

himself and also admits her past 5 months he has been living on the streets.


For his pain he is been using Tylenol as well as hydrocodone which prescribed 

by his primary care physician which she says has done nothing for it. The 

patient states he is been on PPIs in the past after the ulcers but was taken 

off at a year ago by his primary care provider possible interactions with other 

medications. Patient denies history of abdominal surgeries.


 (ANGELO CUELLAR)





Allergies and Home Medications


Allergies


Coded Allergies:  


     No Known Drug Allergies (Unverified , 8/2/12)





Home Medications


Albuterol Sulfate 8.5 Gm Hfa.aer.ad, 2 PUFF IH Q6H PRN for SHORTNESS OF BREATH, 

(Reported)


Calcium Polycarbophil 625 Mg Tablet, 625 MG PO HS, (Reported)


Ciprofloxacin HCl 500 Mg Tablet, 500 MG PO BID, #14 Ref 0


   Prescribed by: LILIANA ROSA on 11/11/16 1111


Cyclobenzaprine HCl 10 Mg Tablet, 10 MG PO BID, (Reported)


Diclofenac Sodium 75 Mg Tablet.dr, 75 MG PO BID, (Reported)


Dicyclomine HCl 20 Mg Tablet, 20 MG PO QID PRN for PAIN, #15


   Prescribed by: KENDRA GUALLPA on 10/20/16 2122


Famotidine 20 Mg Tablet, 20 MG PO BID, #30 Ref 0


   Prescribed by: GRACIELA SOTO on 11/20/17 1305


Fluoxetine HCl 10 Mg Capsule, 10 MG PO DAILY, (Reported)


Hctz/Lisinopril 1 Tab Tablet, 1 TAB PO DAILY, (Reported)


Hydrocodone/Acetaminophen 1 Each Tablet, 1 TAB PO Q6H PRN for PAIN, (Reported)


Hyoscyamine Sulfate 0.125 Mg Tab.subl, 0.125 MG SL Q6H PRN for SPASMS, #14 Ref 0


   Prescribed by: GRACIELA SOTO on 11/20/17 1305


Lorazepam 0.5 Mg Tablet, 0.5 MG PO HS, (Reported)


Montelukast Sodium 10 Mg Tablet, 10 MG PO HS, (Reported)


Omega 3 Polyunsat Fatty Acids 1,000 Mg Cap, 1,000 MG PO DAILY, (Reported)


Omega 3 Polyunsat Fatty Acids 1,000 Mg Cap, 2,000 MG PO HS, (Reported)


   TAKES 2 (1,000MG) CAPSULES 


Ondansetron HCl 4 Mg Tab, 4 MG PO Q6H PRN for NAUSEA/VOMITING, #10 Ref 0


   Prescribed by: LILIANA ROSA on 11/11/16 1111


Pantoprazole Sodium 40 Mg Tablet.dr, 40 MG PO DAILY, (Reported)


Polyethylene Glycol 3350 17 Gm Powd.pack, 17 GM PO DAILY PRN for CONSTIPATION, (

Reported)


Sucralfate 1 Gm Tablet, 1 GM PO BID, (Reported)





Review of Systems


Constitutional:  No chills, No diaphoresis


EENTM:  No Blurred Vision, No Double Vision


Respiratory:  Denies Cough, Denies Orthopnea


Cardiovascular:  Denies Chest Pain, Denies Syncope


Gastrointestinal:  See HPI, Denies Abdomen Distended, Abdominal Pain, Denies 

Constipated, Denies Diarrhea, Denies Nausea, Denies Poor Appetite, Denies Poor 

Fluid Intake, Denies Vomiting


Genitourinary:  Denies Burning, Denies Discharge


Musculoskeletal:  No back pain, No joint pain


Skin:  No pruritus, No rash


Psychiatric/Neurological:  Denies Headache, Denies Numbness, Denies Paresthesia 

(ANGELO CUELLAR)





Past Medical-Social-Family Hx


Patient Social History


Alcohol Use:  Past History


Recreational Drug Use:  No


Smoking Status:  Current Everyday Smoker


Type Used:  Cigarettes (0.5 ppd)


Recent Foreign Travel:  No


Contact w/Someone Who Travel:  No


Recent Hopitalizations:  No


 (ANGELO CUELLAR)





Immunizations Up To Date


Tetanus Booster (TDap):  Less than 5yrs


PED Vaccines UTD:  Yes


Date of Pneumonia Vaccine:  Nov 1, 2011


Date of Influenza Vaccine:  Oct 1, 2011


 (ANGELO CUELLAR)





Seasonal Allergies


Seasonal Allergies:  Yes


 (ANGELO CUELLAR)





Respiratory


Respiratory Disorders:  Asthma, COPD


 (ANGELO CUELLAR)





Cardiovascular


Cardiac Disorders:  Hypertension


 (ANGELO CUELLAR)





Reproductive System


Hx Reproductive Disorders:  No


Sexually Transmitted Disease:  No


HIV/AIDS:  No


 (ANGELO CUELLAR)





Gastrointestinal


Gastrointestinal Disorders:  Gastroesophageal Reflux, Pancreatitis


 (ANGELO CUELLAR)





HEENT


Loss of Vision:  Denies


Hearing Impairment:  Denies


 (ANGELO CUELLAR)





Psychosocial


Behavioral Health Disorders:  Anxiety, Depression


 (ANGELO CUELLAR)





Blood Transfusions


Adverse Reaction to a Blood Tr:  No


 (ANGELO CUELLAR)





Family Medical History


Significant Family History:  No Pertinent Family Hx


Family Medial History:  


Alzheimer's disease


Arthritis


Asthma


Cataracts


Completed stroke


Coronary thrombosis


Diabetes mellitus


Drug abuse


Headache disorder


Hypertension


Myocardial infarction


Seizure disorder


Severe allergy





No Family History of:


  AIDS


  Abdominal aortic aneurysm


  Timbo's disease


  Alcoholism


  Aphasia


  Cancer of mouth


  Cardiovascular disease


  Colon cancer


  Congenital disease


  Congenital heart disease


  Cystic fibrosis


  Deafness or hearing loss


  Dementia


  Dysphasia


  Fibrocystic disease of breast


  Gastroenteritis


  Glaucoma


  Hypercholesterolemia


  Infertility


  Kidney disease


  Neoplasm


  Not obtainable due to adoption


  Osteoporosis


  Parkinson's disease


  Prostate cancer


  Psychosocial problem


  Respiratory disorder


  Thyroid disease


  Tuberculosis


  Visual disorder (ANGELO CUELLAR)


Family Medial History:  


Alzheimer's disease


Arthritis


Asthma


Cataracts


Completed stroke


Coronary thrombosis


Diabetes mellitus


Drug abuse


Headache disorder


Hypertension


Myocardial infarction


Seizure disorder


Severe allergy





No Family History of:


  AIDS


  Abdominal aortic aneurysm


  Timbo's disease


  Alcoholism


  Aphasia


  Cancer of mouth


  Cardiovascular disease


  Colon cancer


  Congenital disease


  Congenital heart disease


  Cystic fibrosis


  Deafness or hearing loss


  Dementia


  Dysphasia


  Fibrocystic disease of breast


  Gastroenteritis


  Glaucoma


  Hypercholesterolemia


  Infertility


  Kidney disease


  Neoplasm


  Not obtainable due to adoption


  Osteoporosis


  Parkinson's disease


  Prostate cancer


  Psychosocial problem


  Respiratory disorder


  Thyroid disease


  Tuberculosis


  Visual disorder (GRACIELA SOTO)





Physical Exam


Vital Signs





 VS - Last 72 Hours, by Label








 11/20/17 11/20/17





 10:55 11:42


 


Temp 97.5 97.5


 


Pulse 94 


 


Resp 18 


 


B/P (MAP) 125/93 


 


Pulse Ox 98 


 


O2 Delivery Room Air 





 (GRACIELA SOTO)


Vital Signs


Capillary Refill :  


 (ANGELO CUELLAR)


General Appearance:  WD/WN, mild distress


HEENT:  PERRL/EOMI, normal ENT inspection, pharynx normal


Neck:  non-tender, supple, normal inspection


Respiratory:  chest non-tender, lungs clear, normal breath sounds


Cardiovascular:  normal peripheral pulses, regular rate, rhythm, no edema


Peripheral Pulses:  2+ Radial Pulses (R), 2+ Radial Pulses (L)


Gastrointestinal:  normal bowel sounds, soft, guarding (epigastric and left 

upper quadrant.), tenderness (especially epigastric and left upper quadrant but 

diffusely throughout except for right lower quadrant.)


Extremities:  no pedal edema, no calf tenderness, normal capillary refill


Neurologic/Psychiatric:  alert, oriented x 3


Skin:  normal color, warm/dry (ANGELO CUELLAR)





Progress/Results/Core Measures


Results/Orders


Lab Results





Laboratory Tests








Test


  11/20/17


11:15 11/20/17


12:38 Range/Units


 


 


White Blood Count


  15.8 H


  


  4.3-11.0


10^3/uL


 


Red Blood Count


  6.03 H


  


  4.35-5.85


10^6/uL


 


Hemoglobin 17.3   13.3-17.7  G/DL


 


Hematocrit 51   40-54  %


 


Mean Corpuscular Volume 85   80-99  FL


 


Mean Corpuscular Hemoglobin 29   25-34  PG


 


Mean Corpuscular Hemoglobin


Concent 34 


  


  32-36  G/DL


 


 


Red Cell Distribution Width 13.6   10.0-14.5  %


 


Platelet Count


  418 H


  


  130-400


10^3/uL


 


Mean Platelet Volume 9.9   7.4-10.4  FL


 


Neutrophils (%) (Auto) 69   42-75  %


 


Lymphocytes (%) (Auto) 23   12-44  %


 


Monocytes (%) (Auto) 7   0-12  %


 


Eosinophils (%) (Auto) 1   0-10  %


 


Basophils (%) (Auto) 0   0-10  %


 


Neutrophils # (Auto) 10.9 H  1.8-7.8  X 10^3


 


Lymphocytes # (Auto) 3.6   1.0-4.0  X 10^3


 


Monocytes # (Auto) 1.1 H  0.0-1.0  X 10^3


 


Eosinophils # (Auto)


  0.2 


  


  0.0-0.3


10^3/uL


 


Basophils # (Auto)


  0.0 


  


  0.0-0.1


10^3/uL


 


Neutrophils % (Manual) 70    %


 


Lymphocytes % (Manual) 12    %


 


Monocytes % (Manual) 10    %


 


Eosinophils % (Manual) 1    %


 


Basophils % (Manual) 1    %


 


Reactive Lymphocytes 6    %


 


Blood Morphology Comment NORMAL    


 


Sodium Level 137   135-145  MMOL/L


 


Potassium Level 3.9   3.6-5.0  MMOL/L


 


Chloride Level 98     MMOL/L


 


Carbon Dioxide Level 26   21-32  MMOL/L


 


Anion Gap 13   5-14  MMOL/L


 


Blood Urea Nitrogen 16   7-18  MG/DL


 


Creatinine


  0.91 


  


  0.60-1.30


MG/DL


 


Estimat Glomerular Filtration


Rate > 60 


  


   


 


 


BUN/Creatinine Ratio 18    


 


Glucose Level 106 H    MG/DL


 


Calcium Level 9.9   8.5-10.1  MG/DL


 


Magnesium Level 1.7 L  1.8-2.4  MG/DL


 


Total Bilirubin 0.4   0.1-1.0  MG/DL


 


Aspartate Amino Transf


(AST/SGOT) 20 


  


  5-34  U/L


 


 


Alanine Aminotransferase


(ALT/SGPT) 29 


  


  0-55  U/L


 


 


Alkaline Phosphatase 74     U/L


 


Total Protein 7.9   6.4-8.2  GM/DL


 


Albumin 4.2   3.2-4.5  GM/DL


 


Lipase 16   8-78  U/L


 


Urine Color  YELLOW   


 


Urine Clarity  CLEAR   


 


Urine pH  6  5-9  


 


Urine Specific Gravity  1.010 L 1.016-1.022  


 


Urine Protein  NEGATIVE  NEGATIVE  


 


Urine Glucose (UA)  NEGATIVE  NEGATIVE  


 


Urine Ketones  NEGATIVE  NEGATIVE  


 


Urine Nitrite  NEGATIVE  NEGATIVE  


 


Urine Bilirubin  NEGATIVE  NEGATIVE  


 


Urine Urobilinogen  NORMAL  NORMAL  MG/DL


 


Urine Leukocyte Esterase  NEGATIVE  NEGATIVE  


 


Urine RBC (Auto)  2+ H NEGATIVE  


 


Urine RBC  NONE   /HPF


 


Urine WBC  NONE   /HPF


 


Urine Squamous Epithelial


Cells 


  RARE 


   /HPF


 


 


Urine Crystals  NONE   /LPF


 


Urine Bacteria  NEGATIVE   /HPF


 


Urine Casts  NONE   /LPF


 


Urine Mucus  SMALL H  /LPF


 


Urine Culture Indicated  NO   


 


Urine Opiates Screen  NEGATIVE  NEGATIVE  


 


Urine Oxycodone Screen  NEGATIVE  NEGATIVE  


 


Urine Methadone Screen  NEGATIVE  NEGATIVE  


 


Urine Propoxyphene Screen  NEGATIVE  NEGATIVE  


 


Urine Barbiturates Screen  NEGATIVE  NEGATIVE  


 


Ur Tricyclic Antidepressants


Screen 


  NEGATIVE 


  NEGATIVE  


 


 


Urine Phencyclidine Screen  NEGATIVE  NEGATIVE  


 


Urine Amphetamines Screen  NEGATIVE  NEGATIVE  


 


Urine Methamphetamines Screen  NEGATIVE  NEGATIVE  


 


Urine Benzodiazepines Screen  POSITIVE H NEGATIVE  


 


Urine Cocaine Screen  NEGATIVE  NEGATIVE  


 


Urine Cannabinoids Screen  POSITIVE H NEGATIVE  





 (GRACIELA SOTO)


Medications Given in ED





Current Medications








 Medications  Dose


 Ordered  Sig/Nadeen


 Route  Start Time


 Stop Time Status Last Admin


Dose Admin


 


 Al Hydrox/Mg


 Hydrox/Simethicone  30 ml  ONCE  ONCE


 PO  11/20/17 11:15


 11/20/17 11:16 DC 11/20/17 11:42


30 ML


 


 Iohexol  100 ml  ONCE  ONCE


 IV  11/20/17 11:15


 11/20/17 11:16 DC 11/20/17 11:27


100 ML


 


 Ketorolac


 Tromethamine  15 mg  ONCE  ONCE


 IVP  11/20/17 11:00


 11/20/17 11:01 DC 11/20/17 11:42


15 MG


 


 Lactated Ringer's  1,000 ml @ 


 0 mls/hr  Q0M ONCE


 IV  11/20/17 10:57


 11/20/17 11:01 DC 11/20/17 12:49


0 MLS/HR


 


 Lidocaine HCl  15 ml  ONCE  ONCE


 PO  11/20/17 11:15


 11/20/17 11:16 DC 11/20/17 11:42


15 ML


 


 Magnesium Sulfate/


 Dextrose  100 ml @ 


 100 mls/hr  ONCE  ONCE


 IV  11/20/17 11:45


 11/20/17 12:44 DC 11/20/17 12:50


100 MLS/HR


 


 Sodium Chloride  100 ml  ONCE  ONCE


 IV  11/20/17 11:15


 11/20/17 11:16 DC 11/20/17 11:27


80 ML


 


 Sodium Chloride  1,000 ml @ 


 0 mls/hr  Q0M ONCE


 IV  11/20/17 10:57


 11/20/17 11:01 DC 11/20/17 11:42


1,000 MLS/HR





 (GRACIELA SOTO)


Vital Signs/I&O





Vital Sign - Last 12Hours








 11/20/17 11/20/17





 10:55 11:42


 


Temp 97.5 97.5


 


Pulse 94 


 


Resp 18 


 


B/P (MAP) 125/93 


 


Pulse Ox 98 


 


O2 Delivery Room Air 





 (GRACIELA SOTO)





Diagnostic Imaging





   Diagonstic Imaging:  CT (with contrast)


   Plain Films/CT/US/NM/MRI:  abdomen, pelvis


   Reviewed:  Reviewed by Me


 (ANGELO CUELLAR)





   Diagonstic Imaging:  CT (with contrast)


   Plain Films/CT/US/NM/MRI:  abdomen, pelvis


Comments


FINDINGS: The lung bases appear clear. The liver demonstrate diffuse hepatic 

steatosis. The gallbladder has no calcified stones. The spleen, the adrenal 

glands and the pancreas appear unremarkable. Kidneys demonstrate focal 

hypodensities less than a centimeter in size, too small to accurately 

characterize. No hydronephrosis. The abdominal aorta is normal in caliber. No 

para-aortic significantly enlarged lymph node is seen. There is no bowel 

obstruction. The appendix is not identified. There is a small amount of fat 

distending the left inguinal canal suggestive of a small indirect inguinal 

hernia. There is also a tiny fat-containing umbilical hernia. No significant 

free fluid or fluid collection in the abdomen or pelvis is seen. The osseous 

structures demonstrate mild degenerative change. IMPRESSION: 1. There are small 

fat-containing left inguinal and tiny umbilical hernias. 2. Hepatic steatosis. 

Dictated by: Dictated on workstation # [id number]


   Reviewed:  Reviewed by Me (radiology report reviewed by me)


 (GRACIELA SOTO)





Departure


Impression


Impression:  


 Primary Impression:  


 Abdominal pain


 Qualified Codes:  R10.13 - Epigastric pain


 Additional Impression:  


 Volume depletion


Disposition:  01 HOME, SELF-CARE


Condition:  Improved





Departure-Patient Inst.


Decision time for Depature:  13:01


 (GRACIELA SOTO)


Referrals:  


[facility] (PCP)


Primary Care Physician








LILIANA CHURCHILL (Family)


Primary Care Physician


Patient Instructions:  Acute Abdomen (Belly Pain), Adult (DC), Dehydration, 

Adult (DC)





Add. Discharge Instructions:  


All discharge instructions reviewed with patient and/or family. Voiced 

understanding.  Medications as instructed.  Continue usual home medications.  

Clear liquid diet until symptoms improve, then increase diet slowly to a low-fat

, bland diet.  Avoid spicy foods, fatty foods, carbonated beverages, 

caffeinated beverages, NSAIDs, aspirin, smoking, secondhand smoke, alcohol, or 

marijuana use.  Do not eat within 2 hours of lying down.  Follow-up with 

[facility] within the next 1-2 days for recheck.  Call [facility] today for appointment time.  Return to the emergency department 

for worsened pain, vomiting, vomiting blood, black stools, rectal bleeding, 

abdominal swelling, fever, or any other concerns.


Scripts


Hyoscyamine Sulfate (Hyoscyamine Sulfate) 0.125 Mg Tab.subl


0.125 MG SL Q6H Y for SPASMS, #14 TAB 0 Refills


   Prov: GRACIELA SOTO         11/20/17 


Famotidine (Pepcid) 20 Mg Tablet


20 MG PO BID, #30 TAB 0 Refills


   Prov: GRACIELA SOTO         11/20/17





Copy


Copies To 1:   BECKI DOOLEY TITUS J Nov 20, 2017 11:03


GRACIELA SOTO Nov 20, 2017 13:05

## 2017-11-20 NOTE — XMS REPORT
[facility]

 Created on: 2016



Dago Morrow

External Reference #: [id number]

: 1975

Sex: Male



Demographics







 Address  [address]

[place]  [address]

 

 Home Phone  [phone]

 

 Preferred Language  Unknown

 

 Marital Status  Unknown

 

 Taoist Affiliation  Unknown

 

 Race  Unreported/Refused to Report

 

 Ethnic Group  Not  or 





Author







 LILIANA Street

 

 [facility]  [employer]

 

 Address  Unknown

 

 Phone  Unavailable







Care Team Providers







 Care Team Member Name  Role  Phone

 

 LILIANA CHURCHILL  CP  Unavailable



                                                                



Allergies, Adverse Reactions, Alerts

          





 Substance  Reaction  Event Type

 

 N.K.D.A.  Info Not Available  Non Drug Allergy



                                                                               
         



Problems

          





 Problem Type  Condition  Code  Onset Dates  Condition Status

 

 Problem  Essential hypertension, benign  401.1     Active

 

 Problem  Abdominal pain, generalized  789.07     Active

 

 Problem  Hematuria, unspecified  599.70     Active

 

 Problem  Lumbago  724.2     Active

 

 Problem  Family history of other cardiovascular diseases  V17.49     Active

 

 Problem  Asthma, unspecified, unspecified status  493.90     Active

 

 Problem  Pain in joint, lower leg  719.46     Active

 

 Problem  Unspecified episodic mood disorder  296.90     Active

 

 Problem  Unspecified gastritis and gastroduodenitis without mention of 
hemorrhage  535.50     Active

 

 Problem  Family history of diabetes mellitus  V18.0     Active

 

 Assessment  Chest pain, unspecified type  R07.9     Active

 

 Problem  Other and unspecified bipolar disorders  296.89     Active

 

 Problem  Other seborrheic keratosis  702.19     Active



                                                                               
                                                                               
                                                  



Medications

          





 Medication  Code System  Code  Instructions  Start Date  End Date  Status  
Dosage

 

 Prozac  NDC  [address]-98  10 MG Orally Once a day  May 01, 2015        1 
capsule

 

 Diclofenac Sodium  NDC  [address]-04  75 MG Orally Twice a day       1 tablet

 

 Protonix  NDC  83211044528  40 MG Orally Once a day           1 tablet

 

 Lorazepam  NDC  [address]-51  0.5 MG Orally Once a day  Aug 05, 2015        1 
tablet as needed

 

 Zofran  NDC  [address]-73  8 MG Orally Once a day  Dec 16, 2015        1 
tablet

 

 Lisinopril-Hydrochlorothiazide  NDC  [address]-30  20-25 MG  Once a day      
     TAKE ONE TABLET

 

 Singulair  NDC  32663384990  10 MG             TAKE ONE TABLET BY MOUTH AT 
BEDTIME

 

 Cyclobenzaprine HCl  NDC  65700549941  10 MG Orally 2 times a day           1 
tablet

 

 Norco  NDC  [address]-56  7.5-325 MG Orally 3 times a day  2015    
    1 tablet as needed

 

 Albuterol Sulfate  NDC  [address]-15  90 mcg/actuation  every 6 hrs  Oct 01, 
2014        inhale 2 puffs

 

 MiraLax  NDC  [address]-82  17 gm/dose Orally Once a day  Dec 16, 2015        
1 capful mixed with 8 ounces of fluid

 

 Sucralfate  NDC  75105925899  1 GM             TAKE ONE TABELT BY MOUTH  
BEFORE MEALS AND AT BEDTIME



                                                                               
                                                                               
                                        



Procedures

          





 Procedure  Coding System  Code  Date

 

 ELECTROCARDIOGRAM, TRACING  CPT-4  81252  2016

 

 Office Visit, Est Pt., Level 3  CPT-4  01521  2016

 

 CHEST X-RAY  CPT-4  40523  2016



                                                                               
                                       



Vital Signs

          





 Date/Time:  2016

 

 Temperature  98.0 F

 

 Weight  305 lbs

 

 Height  72 in

 

 BMI  41.36 Index

 

 Blood Pressure Diastolic  82 mmHg

 

 Blood Pressure Systolic  128 mmHg

 

 Cardiac Monitoring Heart Rate  98 bpm



                                                                              



Results

          No Known Results                                                     
               



Summary Purpose

          [employer] Submission

## 2017-11-20 NOTE — XMS REPORT
[facility]

 Created on: 2015



Dago Morrow

External Reference #: [id number]

: 1975

Sex: Male



Demographics







 Address  [address]

[place]  [address]

 

 Home Phone  [phone]

 

 Preferred Language  Unknown

 

 Marital Status  Unknown

 

 Spiritism Affiliation  Unknown

 

 Race  Unreported/Refused to Report

 

 Ethnic Group  Not  or 





Author







 Author  LILIANA CHURCHILL

 

 [facility]  [employer]

 

 Address  Unknown

 

 Phone  Unavailable







Care Team Providers







 Care Team Member Name  Role  Phone

 

 LILIANA CHURCHILL  CP  Unavailable



                                                                



Allergies

          No Known Allergies                                                   
                                     



Problems

          





 Problem Type  Condition  ICD-9 Code  Onset Dates  Condition Status

 

 Problem  Essential hypertension, benign  401.1     Active

 

 Problem  Abdominal pain, generalized  789.07     Active

 

 Problem  Hematuria, unspecified  599.70     Active

 

 Problem  Other and unspecified bipolar disorders  296.89     Active

 

 Problem  Other seborrheic keratosis  702.19     Active

 

 Problem  Lumbago  724.2     Active

 

 Problem  Family history of other cardiovascular diseases  V17.49     Active

 

 Problem  Asthma, unspecified, unspecified status  493.90     Active

 

 Problem  Pain in joint, lower leg  719.46     Active

 

 Problem  Unspecified episodic mood disorder  296.90     Active

 

 Problem  Unspecified gastritis and gastroduodenitis without mention of 
hemorrhage  535.50     Active

 

 Problem  Family history of diabetes mellitus  V18.0     Active



                                                                               
                                                                               
                                        



Medications

          No Known Medications                                                 
                             



Results

          No Known Results                                                     
               



Summary Purpose

          [email or web address] Submission

## 2017-11-20 NOTE — XMS REPORT
[facility]

 Created on: 2016



Dago Morrow

External Reference #: [id number]

: 1975

Sex: Male



Demographics







 Address  [address]

[place]  [address]

 

 Home Phone  [phone]

 

 Preferred Language  Unknown

 

 Marital Status  Unknown

 

 Orthodox Affiliation  Unknown

 

 Race  Unreported/Refused to Report

 

 Ethnic Group  Not  or 





Author







 Author  LILIANA CHURCHILL

 

 [facility]  [employer]

 

 Address  Unknown

 

 Phone  Unavailable







Care Team Providers







 Care Team Member Name  Role  Phone

 

 LILIANA CHURCHILL  CP  Unavailable



                                                                



Allergies

          No Known Allergies                                                   
                                     



Problems

          





 Problem Type  Condition  Code  Onset Dates  Condition Status

 

 Problem  Essential hypertension, benign  401.1     Active

 

 Problem  Abdominal pain, generalized  789.07     Active

 

 Problem  Hematuria, unspecified  599.70     Active

 

 Problem  Other and unspecified bipolar disorders  296.89     Active

 

 Problem  Other seborrheic keratosis  702.19     Active

 

 Problem  Lumbago  724.2     Active

 

 Problem  Family history of other cardiovascular diseases  V17.49     Active

 

 Problem  Asthma, unspecified, unspecified status  493.90     Active

 

 Problem  Pain in joint, lower leg  719.46     Active

 

 Problem  Unspecified episodic mood disorder  296.90     Active

 

 Problem  Unspecified gastritis and gastroduodenitis without mention of 
hemorrhage  535.50     Active

 

 Problem  Family history of diabetes mellitus  V18.0     Active



                                                                               
                                                                               
                                        



Medications

          





 Medication  Code System  Code  Instructions  Start Date  End Date  Status  
Dosage

 

 Norco  NDC  [address]-22  7.5-325 MG Orally 3 times a day  2015    
    1 tablet as needed



                                                                              



Results

          No Known Results                                                     
               



Summary Purpose

          eClinicalWorks Submission

## 2017-11-20 NOTE — XMS REPORT
[facility]

 Created on: 2017



Dago Morrow

External Reference #: [id number]

: 1975

Sex: Male



Demographics







 Address  [address]  [address]

 

 Preferred Language  Unknown

 

 Marital Status  Unknown

 

 Anabaptism Affiliation  Unknown

 

 Race  Unknown

 

 Ethnic Group  Unknown





Author







 Author  LILIANA CHURCHILL

 

 Organization  [facility]

 

 Address  [address] [place]  [address]



 

 Phone  [phone]







Care Team Providers







 Care Team Member Name  Role  Phone

 

 ZHAOLILIANA  Unavailable  [phone]







PROBLEMS







 Type  Condition  ICD9-CM Code  ESU06-YZ Code  Onset Dates  Condition Status  
SNOMED Code

 

 Problem  Mood disorder     F39     Active  60977845

 

 Problem  Anxiety disorder, unspecified     F41.9     Active  614064184

 

 Problem  Other chronic pain     G89.29     Active  22779092

 

 Problem  Type 2 diabetes mellitus with hyperglycemia     E11.65     Active  
600254831512338

 

 Problem  Type 2 diabetes mellitus with diabetic polyneuropathy     E11.42     
Active  26536923

 

 Problem  Uncontrolled type 2 diabetes mellitus without complication, without 
long-term current use of insulin     E11.65     Active  569728377

 

 Problem  Diabetic polyneuropathy associated with type 2 diabetes mellitus     
E11.42     Active  06503443

 

 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  
575610121

 

 Problem  Chronic obstructive pulmonary disease, unspecified COPD type     
J44.9     Active  95594718

 

 Problem  Anxiety     F41.9     Active  69802363

 

 Problem  Social phobia     F40.10     Active  26068873

 

 Problem  Major depression, melancholic type     F32.9     Active  805590316







ALLERGIES

No Information



SOCIAL HISTORY

Never Assessed



PLAN OF CARE





VITAL SIGNS





MEDICATIONS







 Medication  Instructions  Dosage  Frequency  Start Date  End Date  Duration  
Status

 

 True Metrix Blood Glucose Test -  In Vitro 2 times a day  as directed  12  15 
Sep, 2016     90 days  Active







RESULTS

No Results



PROCEDURES

No Known procedures



IMMUNIZATIONS

No Known Immunizations



MEDICAL (GENERAL) HISTORY







 Type  Description  Date

 

 Medical History  hypertension   

 

 Medical History  asthma   

 

 Medical History  depression and mood disorder   

 

 Medical History  chronic back pain   

 

 Surgical History  angiogram(heart cath)  2016

 

 Hospitalization History  pancreatitis  

 

 Hospitalization History  ER visit for abdominal crampintg  2015

## 2017-11-20 NOTE — XMS REPORT
[facility]

 Created on: 10/12/2016



Dago Morrow

External Reference #: [id number]

: 1975

Sex: Male



Demographics







 Address  [address]

[place]  [address]

 

 Home Phone  [phone]

 

 Preferred Language  Unknown

 

 Marital Status  Unknown

 

 Congregational Affiliation  Unknown

 

 Race  White

 

 Ethnic Group  Not  or 





Author







 Author  LILIANA CHURCHILL

 

 Organization  [employer]

 

 Address  Unknown

 

 Phone  Unavailable







Care Team Providers







 Care Team Member Name  Role  Phone

 

 LILIANA CHURCHILL  CP  Unavailable



                                                                



Allergies

          No Known Allergies                                                   
                                     



Problems

          





 Problem Type  Condition  Code  Onset Dates  Condition Status

 

 Problem  Abdominal pain, generalized  789.07     Active

 

 Problem  Pain in joint, lower leg  719.46     Active

 

 Problem  Unspecified episodic mood disorder  296.90     Active

 

 Problem  Type 2 diabetes mellitus with hyperglycemia  E11.65     Active

 

 Problem  Asthma, unspecified, unspecified status  493.90     Active

 

 Problem  Type 2 diabetes mellitus with diabetic polyneuropathy  E11.42     
Active

 

 Problem  Unspecified gastritis and gastroduodenitis without mention of 
hemorrhage  535.50     Active

 

 Problem  Family history of diabetes mellitus  V18.0     Active

 

 Problem  Lumbago  724.2     Active

 

 Problem  Family history of other cardiovascular diseases  V17.49     Active

 

 Problem  Other and unspecified bipolar disorders  296.89     Active

 

 Problem  Other seborrheic keratosis  702.19     Active

 

 Problem  Essential hypertension, benign  401.1     Active

 

 Problem  Hematuria, unspecified  599.70     Active



                                                                               
                                                                               
                                                            



Medications

          





 Medication  Code System  Code  Instructions  Start Date  End Date  Status  
Dosage

 

 Norco  NDC  [address]-71  7.5-325 MG Orally 3 times a day  2015    
    1 tablet as needed



                                                                              



Results

          No Known Results                                                     
               



Summary Purpose

          eClinicalWorks Submission

## 2017-11-20 NOTE — XMS REPORT
[facility]

 Created on: 2015



Dago Morrow

External Reference #: [id number]

: 1975

Sex: Male



Demographics







 Address  [address]

[place]  [address]

 

 Home Phone  [phone]

 

 Preferred Language  Unknown

 

 Marital Status  Unknown

 

 Mormonism Affiliation  Unknown

 

 Race  Unreported/Refused to Report

 

 Ethnic Group  Not  or 





Author







 Author  LILIANA CHURCHILL

 

 [facility]  [employer]

 

 Address  Unknown

 

 Phone  Unavailable







Care Team Providers







 Care Team Member Name  Role  Phone

 

 LILIANA CHURCHILL  CP  Unavailable



                                                                



Allergies

          No Known Allergies                                                   
                                     



Problems

          





 Problem Type  Condition  Code  Onset Dates  Condition Status

 

 Problem  Essential hypertension, benign  401.1     Active

 

 Problem  Abdominal pain, generalized  789.07     Active

 

 Problem  Hematuria, unspecified  599.70     Active

 

 Problem  Other and unspecified bipolar disorders  296.89     Active

 

 Problem  Other seborrheic keratosis  702.19     Active

 

 Problem  Lumbago  724.2     Active

 

 Problem  Family history of other cardiovascular diseases  V17.49     Active

 

 Problem  Asthma, unspecified, unspecified status  493.90     Active

 

 Problem  Pain in joint, lower leg  719.46     Active

 

 Problem  Unspecified episodic mood disorder  296.90     Active

 

 Problem  Unspecified gastritis and gastroduodenitis without mention of 
hemorrhage  535.50     Active

 

 Problem  Family history of diabetes mellitus  V18.0     Active



                                                                               
                                                                               
                                        



Medications

          





 Medication  Code System  Code  Instructions  Start Date  End Date  Status  
Dosage

 

 Norco  NDC  [address]-99  7.5-325 MG Orally   2015        take 1 
tablet by Oral route 2 times per day as needed for pain  prn



                                                                              



Results

          No Known Results                                                     
               



Summary Purpose

          eClinicalWorks Submission

## 2017-11-20 NOTE — XMS REPORT
[facility]

 Created on: 10/27/2016



Dago Morrow

External Reference #: [id number]

: 1975

Sex: Male



Demographics







 Address  [address]

[place]  [address]

 

 Home Phone  [phone]

 

 Preferred Language  Unknown

 

 Marital Status  Unknown

 

 Tenriism Affiliation  Unknown

 

 Race  White

 

 Ethnic Group  Not  or 





Author







 Author  LILIANA CHURCHILL

 

 Organization  [employer]

 

 Address  Unknown

 

 Phone  Unavailable







Care Team Providers







 Care Team Member Name  Role  Phone

 

 LILIANA CHURCHILL  CP  Unavailable



                                                                



Allergies

          No Known Allergies                                                   
                                     



Problems

          





 Problem Type  Condition  Code  Onset Dates  Condition Status

 

 Problem  Pain in joint, lower leg  719.46     Active

 

 Problem  Unspecified gastritis and gastroduodenitis without mention of 
hemorrhage  535.50     Active

 

 Problem  Family history of diabetes mellitus  V18.0     Active

 

 Problem  Uncontrolled type 2 diabetes mellitus without complication, without 
long-term current use of insulin  E11.65     Active

 

 Problem  Type 2 diabetes mellitus with diabetic polyneuropathy  E11.42     
Active

 

 Problem  Mood disorder  F39     Active

 

 Problem  Lumbago  724.2     Active

 

 Problem  Family history of other cardiovascular diseases  V17.49     Active

 

 Problem  Type 2 diabetes mellitus with hyperglycemia  E11.65     Active

 

 Problem  Asthma, unspecified, unspecified status  493.90     Active

 

 Problem  Essential hypertension, benign  401.1     Active

 

 Problem  Hematuria, unspecified  599.70     Active

 

 Problem  Other and unspecified bipolar disorders  296.89     Active

 

 Problem  Abdominal pain, generalized  789.07     Active

 

 Problem  Other seborrheic keratosis  702.19     Active

 

 Problem  Unspecified episodic mood disorder  296.90     Active



                                                                               
                                                                               
                                                                                



Medications

          





 Medication  Code System  Code  Instructions  Start Date  End Date  Status  
Dosage

 

 Metformin HCl  NDC  [address]-55  1000 MG Orally Twice a day  2016  
      1 tablet with meals



                                                                              



Results

          No Known Results                                                     
               



Summary Purpose

          eClinicalWorks Submission

## 2017-11-20 NOTE — XMS REPORT
[facility]

 Created on: 08/10/2016



Dago Morrow

External Reference #: [id number]

: 1975

Sex: Male



Demographics







 Address  [address]

[place]  [address]

 

 Home Phone  [phone]

 

 Preferred Language  Unknown

 

 Marital Status  Unknown

 

 Anglican Affiliation  Unknown

 

 Race  White

 

 Ethnic Group  Not  or 





Author







 Author  LILIANA CHURCHILL

 

 Organization  [employer]

 

 Address  Unknown

 

 Phone  Unavailable







Care Team Providers







 Care Team Member Name  Role  Phone

 

 LILIANA CHURCHILL  CP  Unavailable



                                                                



Allergies

          No Known Allergies                                                   
                                     



Problems

          





 Problem Type  Condition  Code  Onset Dates  Condition Status

 

 Problem  Abdominal pain, generalized  789.07     Active

 

 Problem  Pain in joint, lower leg  719.46     Active

 

 Problem  Unspecified episodic mood disorder  296.90     Active

 

 Problem  Type 2 diabetes mellitus with hyperglycemia  E11.65     Active

 

 Problem  Asthma, unspecified, unspecified status  493.90     Active

 

 Problem  Type 2 diabetes mellitus with diabetic polyneuropathy  E11.42     
Active

 

 Problem  Unspecified gastritis and gastroduodenitis without mention of 
hemorrhage  535.50     Active

 

 Problem  Family history of diabetes mellitus  V18.0     Active

 

 Problem  Lumbago  724.2     Active

 

 Problem  Family history of other cardiovascular diseases  V17.49     Active

 

 Problem  Other and unspecified bipolar disorders  296.89     Active

 

 Problem  Other seborrheic keratosis  702.19     Active

 

 Problem  Essential hypertension, benign  401.1     Active

 

 Problem  Hematuria, unspecified  599.70     Active



                                                                               
                                                                               
                                                            



Medications

          





 Medication  Code System  Code  Instructions  Start Date  End Date  Status  
Dosage

 

 Norco  NDC  [address]-07  7.5-325 MG Orally 3 times a day  2015    
    1 tablet as needed

 

 Lorazepam  NDC  [address]-24  0.5 MG Orally Once a day  Aug 05, 2015        1 
tablet as needed



                                                                               
         



Results

          No Known Results                                                     
               



Summary Purpose

          eClinicalWorks Submission

## 2017-11-20 NOTE — XMS REPORT
[facility]

 Created on: 2015



Dago Morrow

External Reference #: [id number]

: 1975

Sex: Male



Demographics







 Address  [address]

[place]  [address]

 

 Home Phone  [phone]

 

 Preferred Language  Unknown

 

 Marital Status  Unknown

 

 Moravian Affiliation  Unknown

 

 Race  Unreported/Refused to Report

 

 Ethnic Group  Not  or 





Author







 Author  LILIANA CHURCHILL

 

 Organization  [employer]

 

 Address  Unknown

 

 Phone  Unavailable







Care Team Providers







 Care Team Member Name  Role  Phone

 

 LILIANA CHURCHILL  CP  Unavailable



                                                                



Allergies

          No Known Allergies                                                   
                                     



Problems

          





 Problem Type  Condition  Code  Onset Dates  Condition Status

 

 Problem  Essential hypertension, benign  401.1     Active

 

 Problem  Abdominal pain, generalized  789.07     Active

 

 Problem  Hematuria, unspecified  599.70     Active

 

 Problem  Other and unspecified bipolar disorders  296.89     Active

 

 Problem  Other seborrheic keratosis  702.19     Active

 

 Problem  Lumbago  724.2     Active

 

 Problem  Family history of other cardiovascular diseases  V17.49     Active

 

 Problem  Asthma, unspecified, unspecified status  493.90     Active

 

 Problem  Pain in joint, lower leg  719.46     Active

 

 Problem  Unspecified episodic mood disorder  296.90     Active

 

 Problem  Unspecified gastritis and gastroduodenitis without mention of 
hemorrhage  535.50     Active

 

 Problem  Family history of diabetes mellitus  V18.0     Active



                                                                               
                                                                               
                                        



Medications

          





 Medication  Code System  Code  Instructions  Start Date  End Date  Status  
Dosage

 

 Lorazepam  NDC  [address]-98  0.5 MG Orally Twice a day  Aug 05, 2015        
1 tablet as needed



                                                                              



Results

          No Known Results                                                     
               



Summary Purpose

          eClinicalWorks Submission

## 2017-11-20 NOTE — XMS REPORT
[facility]

 Created on: 2015



Dago Morrow

External Reference #: [id number]

: 1975

Sex: Male



Demographics







 Address  [address]

[place]  [address]

 

 Home Phone  [phone]

 

 Preferred Language  Unknown

 

 Marital Status  Unknown

 

 Jehovah's witness Affiliation  Unknown

 

 Race  Unreported/Refused to Report

 

 Ethnic Group  Not  or 





Author







 Author  LILIANA CHURCHILL

 

 Organization  [employer]

 

 Address  Unknown

 

 Phone  Unavailable







Care Team Providers







 Care Team Member Name  Role  Phone

 

 LILIANA CHURCHILL  CP  Unavailable



                                                                



Allergies

          No Known Allergies                                                   
                                     



Problems

          





 Problem Type  Condition  Code  Onset Dates  Condition Status

 

 Problem  Essential hypertension, benign  401.1     Active

 

 Problem  Abdominal pain, generalized  789.07     Active

 

 Problem  Hematuria, unspecified  599.70     Active

 

 Problem  Other and unspecified bipolar disorders  296.89     Active

 

 Problem  Other seborrheic keratosis  702.19     Active

 

 Problem  Lumbago  724.2     Active

 

 Problem  Family history of other cardiovascular diseases  V17.49     Active

 

 Problem  Asthma, unspecified, unspecified status  493.90     Active

 

 Problem  Pain in joint, lower leg  719.46     Active

 

 Problem  Unspecified episodic mood disorder  296.90     Active

 

 Problem  Unspecified gastritis and gastroduodenitis without mention of 
hemorrhage  535.50     Active

 

 Problem  Family history of diabetes mellitus  V18.0     Active



                                                                               
                                                                               
                                        



Medications

          No Known Medications                                                 
                             



Results

          No Known Results                                                     
               



Summary Purpose

          eClinicalWorks Submission

## 2017-11-20 NOTE — XMS REPORT
[facility]

 Created on: 2015



Dago Morrow

External Reference #: [id number]

: 1975

Sex: Male



Demographics







 Address  [address]

[place]  [address]

 

 Home Phone  [phone]

 

 Preferred Language  Unknown

 

 Marital Status  Unknown

 

 Buddhism Affiliation  Unknown

 

 Race  Unreported/Refused to Report

 

 Ethnic Group  Not  or 





Author







 Author  LILIANA CHURCHILL

 

 [facility]  [employer]

 

 Address  Unknown

 

 Phone  Unavailable







Care Team Providers







 Care Team Member Name  Role  Phone

 

 LILIANA CHURCHILL  CP  Unavailable



                                                                



Allergies

          No Known Allergies                                                   
                                     



Problems

          





 Problem Type  Condition  Code  Onset Dates  Condition Status

 

 Problem  Essential hypertension, benign  401.1     Active

 

 Problem  Abdominal pain, generalized  789.07     Active

 

 Problem  Hematuria, unspecified  599.70     Active

 

 Problem  Other and unspecified bipolar disorders  296.89     Active

 

 Problem  Other seborrheic keratosis  702.19     Active

 

 Problem  Lumbago  724.2     Active

 

 Problem  Family history of other cardiovascular diseases  V17.49     Active

 

 Problem  Asthma, unspecified, unspecified status  493.90     Active

 

 Problem  Pain in joint, lower leg  719.46     Active

 

 Problem  Unspecified episodic mood disorder  296.90     Active

 

 Problem  Unspecified gastritis and gastroduodenitis without mention of 
hemorrhage  535.50     Active

 

 Problem  Family history of diabetes mellitus  V18.0     Active



                                                                               
                                                                               
                                        



Medications

          





 Medication  Code System  Code  Instructions  Start Date  End Date  Status  
Dosage

 

 Norco  NDC  [address]-02  7.5-325 MG Orally 3 times a day  2015    
    1 tablet as needed



                                                                              



Results

          No Known Results                                                     
               



Summary Purpose

          [employer]ClinicalWorks Submission

## 2017-11-20 NOTE — XMS REPORT
[facility]

 Created on: 10/05/2015



Dago Morrow

External Reference #: [id number]

: 1975

Sex: Male



Demographics







 Address  [address]

[place]  [address]

 

 Home Phone  [phone]

 

 Preferred Language  Unknown

 

 Marital Status  Unknown

 

 Sikhism Affiliation  Unknown

 

 Race  Unreported/Refused to Report

 

 Ethnic Group  Not  or 





Author







 LILIANA Street

 

 [facility]  eClinicalWorks

 

 Address  Unknown

 

 Phone  Unavailable







Care Team Providers







 Care Team Member Name  Role  Phone

 

 LILIANA CHURCHILL  CP  Unavailable



                                                                



Allergies, Adverse Reactions, Alerts

          





 Substance  Reaction  Event Type

 

 N.K.D.A.  Info Not Available  Non Drug Allergy



                                                                               
         



Problems

          





 Problem Type  Condition  Code  Onset Dates  Condition Status

 

 Problem  Essential hypertension, benign  401.1     Active

 

 Problem  Abdominal pain, generalized  789.07     Active

 

 Problem  Hematuria, unspecified  599.70     Active

 

 Problem  Lumbago  724.2     Active

 

 Problem  Family history of other cardiovascular diseases  V17.49     Active

 

 Problem  Asthma, unspecified, unspecified status  493.90     Active

 

 Problem  Pain in joint, lower leg  719.46     Active

 

 Problem  Unspecified episodic mood disorder  296.90     Active

 

 Problem  Unspecified gastritis and gastroduodenitis without mention of 
hemorrhage  535.50     Active

 

 Problem  Family history of diabetes mellitus  V18.0     Active

 

 Assessment  Lumbago with sciatica, unspecified side  M54.40     Active

 

 Problem  Other and unspecified bipolar disorders  296.89     Active

 

 Problem  Other seborrheic keratosis  702.19     Active



                                                                               
                                                                               
                                                  



Medications

          





 Medication  Code System  Code  Instructions  Start Date  End Date  Status  
Dosage

 

 Lorazepam  NDC  [address]-87  0.5 MG Orally Once a day  Aug 05, 2015        1 
tablet as needed

 

 Protonix  NDC  [address]-61  40 MG Orally Once a day  2015        1 
tablet

 

 Albuterol Sulfate  NDC  [address]-15  90 mcg/actuation    Oct 01, 2014        
inhale 1 puff by inhalation route every 6 hours as needed PRN cough or wheezing

 

 Sucralfate  NDC  20298283723  1 GM             TAKE ONE TABELT BY MOUTH  
BEFORE MEALS AND AT BEDTIME

 

 Prozac  NDC  [address]-57  10 MG Orally Once a day  May 01, 2015        1 
capsule

 

 Lisinopril-Hydrochlorothiazide  NDC  [address]-30  20-25 MG  Once a day      
     TAKE ONE TABLET

 

 Singulair  NDC  31152921647  10 MG             TAKE ONE TABLET BY MOUTH AT 
BEDTIME

 

 Cyclobenzaprine HCl  NDC  [address]-59  10 MG Orally 2 times a day  Oct 05, 
2015        1 tablet

 

 Norco  NDC  [address]-19  7.5-325 MG Orally   2015        take 1 
tablet by Oral route 2 times per day as needed for pain  prn



                                                                               
                                                                               
          



Procedures

          





 Procedure  Coding System  Code  Date

 

 TORADOL (IM) 60 MG/2ML (UP TO 15 MG)  CPT-4    Oct 05, 2015

 

 THER/PROPH/DIAG INJ, SC/IM  CPT-4  63789  Oct 05, 2015

 

 Office Visit, Est Pt., Level 3  CPT-4  14791  Oct 05, 2015

 

 DEPO MEDROL 80 MG/ML  CPT-4    Oct 05, 2015



                                                                               
                                                 



Vital Signs

          





 Date/Time:  Oct 05, 2015

 

 Temperature  97.8 F

 

 Weight  285.5 lbs

 

 Height  72 in

 

 BMI  38.72 Index

 

 Blood Pressure Diastolic  70 mmHg

 

 Blood Pressure Systolic  124 mmHg

 

 Cardiac Monitoring Heart Rate  100 bpm



                                                                              



Results

          No Known Results                                                     
               



Summary Purpose

          eClinicalWorks Submission

## 2017-11-20 NOTE — XMS REPORT
[facility]

 Created on: 10/25/2017



Dago Morrow

External Reference #: [id number]

: 1975

Sex: Male



Demographics







 Address  [address]  [address]

 

 Preferred Language  Unknown

 

 Marital Status  Unknown

 

 Scientology Affiliation  Unknown

 

 Race  Unknown

 

 Ethnic Group  Unknown





Author







 Author  CLARENCE ARGUELLO

 

 Organization  [facility]

 

 Address  [address]  [place]  [address]



 

 Phone  [phone]







Care Team Providers







 Care Team Member Name  Role  Phone

 

 COOPER ARGUELLONETTE  Unavailable  [phone]







PROBLEMS







 Type  Condition  ICD9-CM Code  DPC29-KJ Code  Onset Dates  Condition Status  
SNOMED Code

 

 Problem  Mood disorder     F39     Active  51522378

 

 Problem  Anxiety disorder, unspecified     F41.9     Active  357381468

 

 Problem  Other chronic pain     G89.29     Active  51921658

 

 Problem  Type 2 diabetes mellitus with hyperglycemia     E11.65     Active  
900850424040177

 

 Problem  Type 2 diabetes mellitus with diabetic polyneuropathy     E11.42     
Active  88584284

 

 Problem  Uncontrolled type 2 diabetes mellitus without complication, without 
long-term current use of insulin     E11.65     Active  688733549

 

 Problem  Diabetic polyneuropathy associated with type 2 diabetes mellitus     
E11.42     Active  90950135

 

 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  
685490720

 

 Problem  Chronic obstructive pulmonary disease, unspecified COPD type     
J44.9     Active  79281679

 

 Problem  Anxiety     F41.9     Active  58092229

 

 Problem  Social phobia     F40.10     Active  65344665

 

 Problem  Major depression, melancholic type     F32.9     Active  888587709







ALLERGIES

No Information



SOCIAL HISTORY

Never Assessed



PLAN OF CARE





VITAL SIGNS





MEDICATIONS







 Medication  Instructions  Dosage  Frequency  Start Date  End Date  Duration  
Status

 

 Norco 7.5-325 MG  Orally 3 times a day  1 tablet as needed  8h  03 May, 2017  
   28 days  Active







RESULTS

No Results



PROCEDURES

No Known procedures



IMMUNIZATIONS

No Known Immunizations



MEDICAL (GENERAL) HISTORY







 Type  Description  Date

 

 Medical History  hypertension   

 

 Medical History  asthma   

 

 Medical History  depression and mood disorder   

 

 Medical History  chronic back pain   

 

 Surgical History  angiogram(heart cath)  2016

 

 Hospitalization History  pancreatitis  

 

 Hospitalization History  ER visit for abdominal crampintg  2015

## 2017-11-20 NOTE — XMS REPORT
[facility]

 Created on: 11/10/2017



Dago Morrow

External Reference #: [id number]

: 1975

Sex: Male



Demographics







 Address  [address]  [address]

 

 Preferred Language  Unknown

 

 Marital Status  Unknown

 

 Baptism Affiliation  Unknown

 

 Race  Unknown

 

 Ethnic Group  Unknown





Author







 Author  LILIANA CHURCHILL

 

 Organization  [facility]

 

 Address  [address] [place]  [address]



 

 Phone  [phone]







Care Team Providers







 Care Team Member Name  Role  Phone

 

 ZHAOLILIANA  Unavailable  [phone]







PROBLEMS







 Type  Condition  ICD9-CM Code  NTN66-HK Code  Onset Dates  Condition Status  
SNOMED Code

 

 Problem  Mood disorder     F39     Active  85959944

 

 Problem  Anxiety disorder, unspecified     F41.9     Active  950507216

 

 Problem  Other chronic pain     G89.29     Active  38825349

 

 Problem  Type 2 diabetes mellitus with hyperglycemia     E11.65     Active  
599154417245273

 

 Problem  Type 2 diabetes mellitus with diabetic polyneuropathy     E11.42     
Active  51683101

 

 Problem  Uncontrolled type 2 diabetes mellitus without complication, without 
long-term current use of insulin     E11.65     Active  803846739

 

 Problem  Diabetic polyneuropathy associated with type 2 diabetes mellitus     
E11.42     Active  51334593

 

 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  
072809296

 

 Problem  Chronic obstructive pulmonary disease, unspecified COPD type     
J44.9     Active  79480124

 

 Problem  Anxiety     F41.9     Active  24466233

 

 Problem  Social phobia     F40.10     Active  83739146

 

 Problem  Major depression, melancholic type     F32.9     Active  394329355







ALLERGIES

No Information



SOCIAL HISTORY

Never Assessed



PLAN OF CARE





VITAL SIGNS





MEDICATIONS







 Medication  Instructions  Dosage  Frequency  Start Date  End Date  Duration  
Status

 

 GlipiZIDE 5 MG  Orally 2 times a day  1 tablet  12h        90 days  Active







RESULTS

No Results



PROCEDURES

No Known procedures



IMMUNIZATIONS

No Known Immunizations



MEDICAL (GENERAL) HISTORY







 Type  Description  Date

 

 Medical History  hypertension   

 

 Medical History  asthma   

 

 Medical History  depression and mood disorder   

 

 Medical History  chronic back pain   

 

 Surgical History  angiogram(heart cath)  2016

 

 Hospitalization History  pancreatitis  

 

 Hospitalization History  ER visit for abdominal crampintg  2015

## 2017-11-20 NOTE — XMS REPORT
[facility]

 Created on: 2015



Dago Morrow

External Reference #: [id number]

: 1975

Sex: Male



Demographics







 Address  [address]

[place]  [address]

 

 Home Phone  [phone]

 

 Preferred Language  Unknown

 

 Marital Status  Unknown

 

 Orthodox Affiliation  Unknown

 

 Race  Unreported/Refused to Report

 

 Ethnic Group  Not  or 





Author







 Author  LILIANA CHURCHILL

 

 [facility]  eClinical[employer]

 

 Address  Unknown

 

 Phone  Unavailable







Care Team Providers







 Care Team Member Name  Role  Phone

 

 LILIANA CHURCHILL  CP  Unavailable



                                                                



Allergies

          No Known Allergies                                                   
                                     



Problems

          





 Problem Type  Condition  Code  Onset Dates  Condition Status

 

 Problem  Essential hypertension, benign  401.1     Active

 

 Problem  Abdominal pain, generalized  789.07     Active

 

 Problem  Hematuria, unspecified  599.70     Active

 

 Problem  Other and unspecified bipolar disorders  296.89     Active

 

 Problem  Other seborrheic keratosis  702.19     Active

 

 Problem  Lumbago  724.2     Active

 

 Problem  Family history of other cardiovascular diseases  V17.49     Active

 

 Problem  Asthma, unspecified, unspecified status  493.90     Active

 

 Problem  Pain in joint, lower leg  719.46     Active

 

 Problem  Unspecified episodic mood disorder  296.90     Active

 

 Problem  Unspecified gastritis and gastroduodenitis without mention of 
hemorrhage  535.50     Active

 

 Problem  Family history of diabetes mellitus  V18.0     Active



                                                                               
                                                                               
                                        



Medications

          





 Medication  Code System  Code  Instructions  Start Date  End Date  Status  
Dosage

 

 Norco  NDC  [address]-40  7.5-325 MG Orally 3 times a day  2015    
    1 tablet as needed



                                                                              



Results

          No Known Results                                                     
               



Summary Purpose

          eClinicalWorks Submission

## 2017-11-20 NOTE — XMS REPORT
[facility]

 Created on: 2016



Dago Morrow

External Reference #: [id number]

: 1975

Sex: Male



Demographics







 Address  [address]

[place]  [address]

 

 Home Phone  [phone]

 

 Preferred Language  Unknown

 

 Marital Status  Unknown

 

 Jain Affiliation  Unknown

 

 Race  Unreported/Refused to Report

 

 Ethnic Group  Not  or 





Author







 Author  LILIANA CHURCHILL

 

 [facility]  eClinical[employer]

 

 Address  Unknown

 

 Phone  Unavailable







Care Team Providers







 Care Team Member Name  Role  Phone

 

 LILIANA CHURCHILL  CP  Unavailable



                                                                



Allergies

          No Known Allergies                                                   
                                     



Problems

          





 Problem Type  Condition  Code  Onset Dates  Condition Status

 

 Problem  Essential hypertension, benign  401.1     Active

 

 Problem  Abdominal pain, generalized  789.07     Active

 

 Problem  Hematuria, unspecified  599.70     Active

 

 Problem  Other and unspecified bipolar disorders  296.89     Active

 

 Problem  Other seborrheic keratosis  702.19     Active

 

 Problem  Lumbago  724.2     Active

 

 Problem  Family history of other cardiovascular diseases  V17.49     Active

 

 Problem  Asthma, unspecified, unspecified status  493.90     Active

 

 Problem  Pain in joint, lower leg  719.46     Active

 

 Problem  Unspecified episodic mood disorder  296.90     Active

 

 Problem  Unspecified gastritis and gastroduodenitis without mention of 
hemorrhage  535.50     Active

 

 Problem  Family history of diabetes mellitus  V18.0     Active



                                                                               
                                                                               
                                        



Medications

          





 Medication  Code System  Code  Instructions  Start Date  End Date  Status  
Dosage

 

 Lorazepam  NDC  [address]-08  0.5 MG Orally Once a day  Aug 05, 2015        1 
tablet as needed

 

 Norco  NDC  [address]-60  7.5-325 MG Orally 3 times a day  2015    
    1 tablet as needed



                                                                               
         



Results

          No Known Results                                                     
               



Summary Purpose

          eClinicalWorks Submission

## 2017-11-20 NOTE — XMS REPORT
[facility]

 Created on: 2017



Dago Morrow

External Reference #: [id number]

: 1975

Sex: Male



Demographics







 Address  [address]  [address]

 

 Preferred Language  Unknown

 

 Marital Status  Unknown

 

 Synagogue Affiliation  Unknown

 

 Race  Unknown

 

 Ethnic Group  Unknown





Author







 Author  LILIANA CHURCHILL

 

 Organization  [facility]

 

 Address  [address] [place]  [address]



 

 Phone  [phone]







Care Team Providers







 Care Team Member Name  Role  Phone

 

 LILIANA CHURCHILL  Unavailable  [phone]







PROBLEMS







 Type  Condition  ICD9-CM Code  XIF49-YI Code  Onset Dates  Condition Status  
SNOMED Code

 

 Problem  Mood disorder     F39     Active  70033315

 

 Problem  Anxiety disorder, unspecified     F41.9     Active  768122756

 

 Problem  Other chronic pain     G89.29     Active  70503544

 

 Problem  Type 2 diabetes mellitus with hyperglycemia     E11.65     Active  
543179486029163

 

 Problem  Type 2 diabetes mellitus with diabetic polyneuropathy     E11.42     
Active  08627310

 

 Problem  Uncontrolled type 2 diabetes mellitus without complication, without 
long-term current use of insulin     E11.65     Active  392502070

 

 Problem  Diabetic polyneuropathy associated with type 2 diabetes mellitus     
E11.42     Active  67722234

 

 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  
516543364

 

 Problem  Chronic obstructive pulmonary disease, unspecified COPD type     
J44.9     Active  40237708

 

 Problem  Anxiety     F41.9     Active  50139315

 

 Problem  Social phobia     F40.10     Active  90842395

 

 Problem  Major depression, melancholic type     F32.9     Active  593048524







ALLERGIES

No Information



SOCIAL HISTORY

Never Assessed



PLAN OF CARE





VITAL SIGNS





MEDICATIONS







 Medication  Instructions  Dosage  Frequency  Start Date  End Date  Duration  
Status

 

 Norco 7.5-325 MG  Orally 3 times a day  1 tablet as needed  8h  08 2017  
      Active

 

 Xanax 1 MG  Orally 2 times a day  1 tablet  12h  15 Aug, 2016     28 days  
Active







RESULTS

No Results



PROCEDURES

No Known procedures



IMMUNIZATIONS

No Known Immunizations



MEDICAL (GENERAL) HISTORY







 Type  Description  Date

 

 Medical History  hypertension   

 

 Medical History  asthma   

 

 Medical History  depression and mood disorder   

 

 Medical History  chronic back pain   

 

 Surgical History  angiogram(heart cath)  2016

 

 Hospitalization History  pancreatitis  2014

 

 Hospitalization History  ER visit for abdominal crampintg  2015

## 2017-11-20 NOTE — XMS REPORT
[facility]

 Created on: 2017



Dago Morrow

External Reference #: [id number]

: 1975

Sex: Male



Demographics







 Address  [address]  [address]

 

 Preferred Language  Unknown

 

 Marital Status  Unknown

 

 Congregation Affiliation  Unknown

 

 Race  Unknown

 

 Ethnic Group  Unknown





Author







 Author  LILIANA CHURCHILL

 

 Organization  [facility]

 

 Address  [address] [place]  [address]



 

 Phone  [phone]







Care Team Providers







 Care Team Member Name  Role  Phone

 

 ZHAO  LILIANA  Unavailable  [phone]







PROBLEMS







 Type  Condition  ICD9-CM Code  FOE99-OH Code  Onset Dates  Condition Status  
SNOMED Code

 

 Problem  Mood disorder     F39     Active  94383206

 

 Problem  Anxiety disorder, unspecified     F41.9     Active  989732864

 

 Problem  Other chronic pain     G89.29     Active  04502573

 

 Problem  Type 2 diabetes mellitus with hyperglycemia     E11.65     Active  
323138351327470

 

 Problem  Type 2 diabetes mellitus with diabetic polyneuropathy     E11.42     
Active  30188494

 

 Problem  Uncontrolled type 2 diabetes mellitus without complication, without 
long-term current use of insulin     E11.65     Active  709343422

 

 Problem  Diabetic polyneuropathy associated with type 2 diabetes mellitus     
E11.42     Active  07529288

 

 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  
161171741

 

 Problem  Chronic obstructive pulmonary disease, unspecified COPD type     
J44.9     Active  20391084

 

 Problem  Anxiety     F41.9     Active  41447520

 

 Problem  Social phobia     F40.10     Active  60327455

 

 Problem  Major depression, melancholic type     F32.9     Active  492350674







ALLERGIES

Unknown Allergies



SOCIAL HISTORY

No smoking Hx information available



PLAN OF CARE





VITAL SIGNS





MEDICATIONS







 Medication  Instructions  Dosage  Frequency  Start Date  End Date  Duration  
Status

 

 Norco 7.5-325 MG  Orally 3 times a day  1 tablet as needed  8h    
   28 days  Active







RESULTS

No Results



PROCEDURES

No Known procedures



IMMUNIZATIONS

No Known Immunizations

## 2017-11-20 NOTE — XMS REPORT
[facility]

 Created on: 2015



Dago Morrow

External Reference #: [id number]

: 1975

Sex: Male



Demographics







 Address  [address]

[place]  [address]

 

 Home Phone  [phone]

 

 Preferred Language  Unknown

 

 Marital Status  Unknown

 

 Caodaism Affiliation  Unknown

 

 Race  Unreported/Refused to Report

 

 Ethnic Group  Not  or 





Author







 LILIANA Street

 

 [facility]  [employer]

 

 Address  Unknown

 

 Phone  Unavailable







Care Team Providers







 Care Team Member Name  Role  Phone

 

 LILIANA CHURCHILL  CP  Unavailable



                                                                



Allergies, Adverse Reactions, Alerts

          





 Substance  Reaction  Event Type

 

 N.K.D.A.  Info Not Available  Non Drug Allergy



                                                                               
         



Problems

          





 Problem Type  Condition  Code  Onset Dates  Condition Status

 

 Problem  Essential hypertension, benign  401.1     Active

 

 Problem  Abdominal pain, generalized  789.07     Active

 

 Problem  Hematuria, unspecified  599.70     Active

 

 Problem  Lumbago  724.2     Active

 

 Problem  Family history of other cardiovascular diseases  V17.49     Active

 

 Problem  Asthma, unspecified, unspecified status  493.90     Active

 

 Problem  Pain in joint, lower leg  719.46     Active

 

 Problem  Unspecified episodic mood disorder  296.90     Active

 

 Problem  Unspecified gastritis and gastroduodenitis without mention of 
hemorrhage  535.50     Active

 

 Problem  Family history of diabetes mellitus  V18.0     Active

 

 Assessment  Abdominal pain, left upper quadrant  R10.12     Active

 

 Problem  Other and unspecified bipolar disorders  296.89     Active

 

 Problem  Other seborrheic keratosis  702.19     Active



                                                                               
                                                                               
                                                  



Medications

          





 Medication  Code System  Code  Instructions  Start Date  End Date  Status  
Dosage

 

 Singulair  NDC  27814374834  10 MG             TAKE ONE TABLET BY MOUTH AT 
BEDTIME

 

 Protonix  NDC  18545579882  40 MG Orally Once a day           1 tablet

 

 MiraLax  NDC  [address]-19  17 gm/dose Orally Once a day  Dec 16, 2015        
1 capful mixed with 8 ounces of fluid

 

 Cyclobenzaprine HCl  NDC  [address]-36  10 MG Orally 2 times a day  Oct 05, 
2015        1 tablet

 

 Lisinopril-Hydrochlorothiazide  NDC  [address]-05  20-25 MG  Once a day      
     TAKE ONE TABLET

 

 Norco  NDC  [address]-70  7.5-325 MG Orally 3 times a day  2015    
    1 tablet as needed

 

 Diclofenac Sodium  NDC  [address]-52  75 MG Orally Twice a day       1 tablet

 

 Albuterol Sulfate  NDC  [address]-15  90 mcg/actuation  every 6 hrs  Oct 01, 
2014        inhale 2 puffs

 

 Lorazepam  NDC  [address]-67  0.5 MG Orally Twice a day  Aug 05, 2015        
1 tablet as needed

 

 Carafate  NDC  [address]-49  1 gram    Dec 31, 2014        1 tablet by Oral 
route 4 times per day

 

 Prozac  NDC  [address]-93  10 MG Orally Once a day  May 01, 2015        1 
capsule

 

 Ketorolac Tromethamine  NDC  [address]-11  10 MG Orally every 6 hrs  Dec 16, 
2015  Dec 21, 2015     1 tablet as needed

 

 Sucralfate  NDC  75154024989  1 GM             TAKE ONE TABELT BY MOUTH  
BEFORE MEALS AND AT BEDTIME

 

 Zofran  NDC  [address]-84  8 MG Orally Once a day  Dec 16, 2015        1 
tablet



                                                                               
                                                                               
                                                            



Procedures

          





 Procedure  Coding System  Code  Date

 

 Office Visit, Est Pt., Level 3  CPT-4  21147  Dec 16, 2015



                                                                               
                   



Vital Signs

          





 Date/Time:  Dec 16, 2015

 

 Temperature  97.2 F

 

 Weight  297 lbs

 

 Height  72 in

 

 BMI  40.28 Index

 

 Blood Pressure Diastolic  84 mmHg

 

 Blood Pressure Systolic  130 mmHg

 

 Cardiac Monitoring Heart Rate  96 bpm



                                                                              



Results

          No Known Results                                                     
               



Summary Purpose

          eClinicalWorks Submission

## 2017-11-20 NOTE — XMS REPORT
[facility]

 Created on: 2015



Dago Morrow

External Reference #: [id number]

: 1975

Sex: Male



Demographics







 Address  [address]

[place]  [address]

 

 Home Phone  [phone]

 

 Preferred Language  Unknown

 

 Marital Status  Unknown

 

 Restoration Affiliation  Unknown

 

 Race  Unreported/Refused to Report

 

 Ethnic Group  Not  or 





Author







 Author  LILIANA CHURCHILL

 

 [facility]  eClinicalWorks

 

 Address  Unknown

 

 Phone  Unavailable







Care Team Providers







 Care Team Member Name  Role  Phone

 

 LILIANA CHURCHILL  CP  Unavailable



                                                                



Allergies

          No Known Allergies                                                   
                                     



Problems

          





 Problem Type  Condition  Code  Onset Dates  Condition Status

 

 Problem  Essential hypertension, benign  401.1     Active

 

 Problem  Abdominal pain, generalized  789.07     Active

 

 Problem  Hematuria, unspecified  599.70     Active

 

 Problem  Other and unspecified bipolar disorders  296.89     Active

 

 Problem  Other seborrheic keratosis  702.19     Active

 

 Problem  Lumbago  724.2     Active

 

 Problem  Family history of other cardiovascular diseases  V17.49     Active

 

 Problem  Asthma, unspecified, unspecified status  493.90     Active

 

 Problem  Pain in joint, lower leg  719.46     Active

 

 Problem  Unspecified episodic mood disorder  296.90     Active

 

 Problem  Unspecified gastritis and gastroduodenitis without mention of 
hemorrhage  535.50     Active

 

 Problem  Family history of diabetes mellitus  V18.0     Active



                                                                               
                                                                               
                                        



Medications

          No Known Medications                                                 
                             



Results

          No Known Results                                                     
               



Summary Purpose

          eClinicalWorks Submission

## 2017-11-20 NOTE — XMS REPORT
[facility]

 Created on: 2015



Dago Morrow

External Reference #: [id number]

: 1975

Sex: Male



Demographics







 Address  [address]

[place]  [address]

 

 Home Phone  [phone]

 

 Preferred Language  Unknown

 

 Marital Status  Unknown

 

 Christian Affiliation  Unknown

 

 Race  Unreported/Refused to Report

 

 Ethnic Group  Not  or 





Author







 LILIANA Street

 

 [facility]  e[employer]

 

 Address  Unknown

 

 Phone  Unavailable







Care Team Providers







 Care Team Member Name  Role  Phone

 

 LILIANA CHURCHILL  CP  Unavailable



                                                                



Allergies, Adverse Reactions, Alerts

          





 Substance  Reaction  Event Type

 

 N.K.D.A.  Info Not Available  Non Drug Allergy



                                                                               
         



Problems

          





 Problem Type  Condition  Code  Onset Dates  Condition Status

 

 Problem  Essential hypertension, benign  401.1     Active

 

 Problem  Abdominal pain, generalized  789.07     Active

 

 Problem  Hematuria, unspecified  599.70     Active

 

 Problem  Lumbago  724.2     Active

 

 Problem  Family history of other cardiovascular diseases  V17.49     Active

 

 Problem  Asthma, unspecified, unspecified status  493.90     Active

 

 Problem  Pain in joint, lower leg  719.46     Active

 

 Problem  Unspecified episodic mood disorder  296.90     Active

 

 Problem  Unspecified gastritis and gastroduodenitis without mention of 
hemorrhage  535.50     Active

 

 Problem  Family history of diabetes mellitus  V18.0     Active

 

 Assessment  Contusion of rib on left side, initial encounter  S20.212A     
Active

 

 Problem  Other and unspecified bipolar disorders  296.89     Active

 

 Problem  Other seborrheic keratosis  702.19     Active



                                                                               
                                                                               
                                                  



Medications

          





 Medication  Code System  Code  Instructions  Start Date  End Date  Status  
Dosage

 

 Protonix  NDC  93469396657  40 MG Orally Once a day           1 tablet

 

 Norco  NDC  [address]-68  7.5-325 MG Orally   2015        take 1 
tablet by Oral route 2 times per day as needed for pain  prn

 

 PredniSONE  NDC  [address]-91  20 MG Orally Twice a day       1 tablet with food or milk

 

 Singulair  NDC  00911696864  10 MG             TAKE ONE TABLET BY MOUTH AT 
BEDTIME

 

 Carafate  NDC  [address]-84  1 gram    Dec 31, 2014        1 tablet by Oral 
route 4 times per day

 

 Lorazepam  NDC  [address]-43  0.5 MG Orally Once a day  Aug 05, 2015        1 
tablet as needed

 

 Albuterol Sulfate  NDC  [address]-15  90 mcg/actuation  every 6 hrs  Oct 01, 
2014        inhale 2 puffs

 

 Sucralfate  NDC  61013900312  1 GM             TAKE ONE TABELT BY MOUTH  
BEFORE MEALS AND AT BEDTIME

 

 Prozac  NDC  [address]-40  10 MG Orally Once a day  May 01, 2015        1 
capsule

 

 Lisinopril-Hydrochlorothiazide  NDC  [address]-28  20-25 MG  Once a day      
     TAKE ONE TABLET

 

 Diclofenac Sodium  NDC  [address]-73  75 MG Orally Twice a day       1 tablet

 

 Cyclobenzaprine HCl  NDC  [address]-30  10 MG Orally 2 times a day  Oct 05, 
2015        1 tablet



                                                                               
                                                                               
                                        



Procedures

          





 Procedure  Coding System  Code  Date

 

 Office Visit, Est Pt., Level 3  CPT-4  53218  2015

 

 TORADOL (IM) 60 MG/2ML (UP TO 15 MG)  CPT-4    2015

 

 CHEST X-RAY  CPT-4  31546  2015

 

 THER/PROPH/DIAG INJ, SC/IM  CPT-4  23979  2015



                                                                               
                                                 



Vital Signs

          





 Date/Time:  2015

 

 Temperature  97.1 F

 

 Weight  295.4 lbs

 

 Height  72 in

 

 BMI  40.06 Index

 

 Blood Pressure Diastolic  78 mmHg

 

 Blood Pressure Systolic  124 mmHg

 

 Cardiac Monitoring Heart Rate  108 bpm



                                                                              



Results

          No Known Results                                                     
               



Summary Purpose

          eClinicalWorks Submission

## 2017-11-20 NOTE — XMS REPORT
[facility]

 Created on: 2017



Dago Morrow

External Reference #: [id number]

: 1975

Sex: Male



Demographics







 Address  [address] [place] [address]  [address]

 

 Preferred Language  Unknown

 

 Marital Status  Unknown

 

 Baptism Affiliation  Unknown

 

 Race  Unknown

 

 Ethnic Group  Unknown





Author







 Author  DAGO STYLES

 

 Organization  [facility]

 

 Address  [address] [place]  [address]



 

 Phone  [phone]







Care Team Providers







 Care Team Member Name  Role  Phone

 

 DAGO STYLES  Unavailable  [phone]







PROBLEMS







 Type  Condition  ICD9-CM Code  PPU30-EI Code  Onset Dates  Condition Status  
SNOMED Code

 

 Problem  Mood disorder     F39     Active  05709968

 

 Problem  Anxiety disorder, unspecified     F41.9     Active  092524478

 

 Problem  Other chronic pain     G89.29     Active  10754206

 

 Problem  Type 2 diabetes mellitus with hyperglycemia     E11.65     Active  
253926354786184

 

 Problem  Type 2 diabetes mellitus with diabetic polyneuropathy     E11.42     
Active  11287647

 

 Problem  Uncontrolled type 2 diabetes mellitus without complication, without 
long-term current use of insulin     E11.65     Active  435299230

 

 Problem  Diabetic polyneuropathy associated with type 2 diabetes mellitus     
E11.42     Active  38122986

 

 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  
916170242

 

 Problem  Chronic obstructive pulmonary disease, unspecified COPD type     
J44.9     Active  53402830

 

 Problem  Anxiety     F41.9     Active  16659059

 

 Problem  Social phobia     F40.10     Active  88803978

 

 Problem  Major depression, melancholic type     F32.9     Active  083704045







ALLERGIES

No Information



SOCIAL HISTORY

Never Assessed



PLAN OF CARE







 Activity  Details









  









 Follow Up  3 Weeks Reason:Depression







VITAL SIGNS





MEDICATIONS

Unknown Medications



RESULTS

No Results



PROCEDURES







 Procedure  Date Ordered  Result  Body Site

 

 Psychotherapy, patient &/family, 30 minutes, established patient  2017      







IMMUNIZATIONS

No Known Immunizations



MEDICAL (GENERAL) HISTORY







 Type  Description  Date

 

 Medical History  hypertension   

 

 Medical History  asthma   

 

 Medical History  depression and mood disorder   

 

 Medical History  chronic back pain   

 

 Surgical History  angiogram(heart cath)  2016

 

 Hospitalization History  pancreatitis  

 

 Hospitalization History  ER visit for abdominal crampintg  2015

## 2017-11-20 NOTE — XMS REPORT
[facility]

 Created on: 2015



Dago Morrow

External Reference #: [id number]

: 1975

Sex: Male



Demographics







 Address  [address]

[place]  [address]

 

 Home Phone  [phone]

 

 Preferred Language  Unknown

 

 Marital Status  Unknown

 

 Bahai Affiliation  Unknown

 

 Race  Unreported/Refused to Report

 

 Ethnic Group  Not  or 





Author







 Author  LILIANA CHURCHILL

 

 Organization  [employer]

 

 Address  Unknown

 

 Phone  Unavailable







Care Team Providers







 Care Team Member Name  Role  Phone

 

 LILIANA CHURCHILL  CP  Unavailable



                                                                



Allergies

          No Known Allergies                                                   
                                     



Problems

          





 Problem Type  Condition  ICD-9 Code  Onset Dates  Condition Status

 

 Problem  Essential hypertension, benign  401.1     Active

 

 Problem  Abdominal pain, generalized  789.07     Active

 

 Problem  Hematuria, unspecified  599.70     Active

 

 Problem  Lumbago  724.2     Active

 

 Problem  Family history of other cardiovascular diseases  V17.49     Active

 

 Problem  Asthma, unspecified, unspecified status  493.90     Active

 

 Problem  Pain in joint, lower leg  719.46     Active

 

 Problem  Unspecified episodic mood disorder  296.90     Active

 

 Problem  Unspecified gastritis and gastroduodenitis without mention of 
hemorrhage  535.50     Active

 

 Problem  Family history of diabetes mellitus  V18.0     Active

 

 Assessment  Anxiety  300.00     Active

 

 Problem  Other and unspecified bipolar disorders  296.89     Active

 

 Problem  Other seborrheic keratosis  702.19     Active



                                                                               
                                                                               
                                                  



Medications

          





 Medication  Code System  Code  Instructions  Start Date  End Date  Status  
Dosage

 

 Lorazepam  NDC  [address]-05  0.5 MG Orally Once a day  Aug 05, 2015        1 
tablet as needed



                                                                              



Results

          No Known Results                                                     
               



Summary Purpose

          eClinicalWorks Submission

## 2017-11-20 NOTE — XMS REPORT
[facility]

 Created on: 2016



Dago Morrow

External Reference #: [id number]

: 1975

Sex: Male



Demographics







 Address  [address]

[place]  [address]

 

 Home Phone  [phone]

 

 Preferred Language  Unknown

 

 Marital Status  Unknown

 

 Taoist Affiliation  Unknown

 

 Race  White

 

 Ethnic Group  Not  or 





Author







 LILIANA Street

 

 [facility]  e[employer]

 

 Address  Unknown

 

 Phone  Unavailable







Care Team Providers







 Care Team Member Name  Role  Phone

 

 LILIANA CHURCHILL  CP  Unavailable



                                                                



Allergies, Adverse Reactions, Alerts

          





 Substance  Reaction  Event Type

 

 N.K.D.A.  Info Not Available  Non Drug Allergy



                                                                               
         



Problems

          





 Problem Type  Condition  Code  Onset Dates  Condition Status

 

 Problem  Abdominal pain, generalized  789.07     Active

 

 Problem  Pain in joint, lower leg  719.46     Active

 

 Problem  Unspecified episodic mood disorder  296.90     Active

 

 Problem  Type 2 diabetes mellitus with hyperglycemia  E11.65     Active

 

 Problem  Asthma, unspecified, unspecified status  493.90     Active

 

 Problem  Type 2 diabetes mellitus with diabetic polyneuropathy  E11.42     
Active

 

 Problem  Unspecified gastritis and gastroduodenitis without mention of 
hemorrhage  535.50     Active

 

 Problem  Family history of diabetes mellitus  V18.0     Active

 

 Problem  Lumbago  724.2     Active

 

 Problem  Family history of other cardiovascular diseases  V17.49     Active

 

 Problem  Other and unspecified bipolar disorders  296.89     Active

 

 Problem  Other seborrheic keratosis  702.19     Active

 

 Assessment  Anxiety  F41.9     Active

 

 Problem  Essential hypertension, benign  401.1     Active

 

 Assessment  Diabetic polyneuropathy associated with type 2 diabetes mellitus  
E11.42     Active

 

 Problem  Hematuria, unspecified  599.70     Active



                                                                               
                                                                               
                                                                                



Medications

          





 Medication  Code System  Code  Instructions  Start Date  End Date  Status  
Dosage

 

 Albuterol Sulfate  NDC  [address]-77  90 mcg/actuation  every 6 hrs  Oct 01, 
2014        inhale 2 puffs

 

 Sucralfate  NDC  42140487762  1 GM             TAKE ONE TABELT BY MOUTH  
BEFORE MEALS AND AT BEDTIME

 

 Prozac  NDC  [address]-79  20 MG Orally Once a day           1 capsule

 

 Metformin HCl  NDC  [address]-86  1000 MG Orally Twice a day  2016  
      1 tablet with meals

 

 Diclofenac Sodium  NDC  59605529374  75 MG Orally Twice a day           1 
tablet

 

 Cyclobenzaprine HCl  NDC  [address]-03  10 mg Orally 2 times a day           
1 tablet

 

 Lisinopril-Hydrochlorothiazide  NDC  09956647182  20-25 MG  Once a day        
   1 tablet

 

 Prozac  NDC  [address]-38  10 mg Orally Once a day           1 capsule

 

 Breo Ellipta  NDC  [address]-10  100-25 MCG/INH Inhalation Once a day          1 puff

 

 Accu-Chek Mehnaz  NDC  0  1 In Vitro 3 times a day, with lancets  2016
        test blood sugar

 

 Xanax  NDC  [address]-21  0.5 MG Orally 2 times a day  Aug 15, 2016        1 
tablet

 

 MiraLax  NDC  [address]-89  17 gm/dose Orally Once a day  Dec 16, 2015        
1 capful mixed with 8 ounces of fluid

 

 Zofran  NDC  [address]-82  8 MG Orally Once a day           1 tablet

 

 Protonix  NDC  [address]-91  40 mg Orally Once a day           1 tablet

 

 Norco  NDC  [address]-77  7.5-325 MG Orally 3 times a day  2015    
    1 tablet as needed

 

 Neurontin  NDC  [address]-55  300 MG Orally Three times a day           1 
capsule

 

 Promethazine HCl  NDC  99643050022  25 MG Orally 3 times a day PRN           1 
tablet as needed

 

 Singulair  NDC  [address]-97  10 mg Orally Once a day           1 tablet at 
bedtime



                                                                               
                                                                               
                                                                               
                     



Procedures

          





 Procedure  Coding System  Code  Date

 

 Office Visit, Est Pt., Level 3  CPT-4  93367  Aug 15, 2016



                                                                               
                   



Vital Signs

          





 Date/Time:  Aug 15, 2016

 

 Cardiac Monitoring Heart Rate  96 bpm

 

 Weight  314.4 lbs

 

 Height  72 in

 

 BMI  42.64 Index

 

 Blood Pressure Diastolic  94 mmHg

 

 Blood Pressure Systolic  130 mmHg



                                                                              



Results

          No Known Results                                                     
               



Summary Purpose

          eClinicalWorks Submission

## 2017-11-20 NOTE — XMS REPORT
[facility]

 Created on: 2016



Dago Morrow

External Reference #: [id number]

: 1975

Sex: Male



Demographics







 Address  [address]

[place]  [address]

 

 Home Phone  [phone]

 

 Preferred Language  Unknown

 

 Marital Status  Unknown

 

 Jew Affiliation  Unknown

 

 Race  White

 

 Ethnic Group  Not  or 





Author







 Author  LILIANA CHURCHILL

 

 Organization  [employer]

 

 Address  Unknown

 

 Phone  Unavailable







Care Team Providers







 Care Team Member Name  Role  Phone

 

 LILIANA CHURCHILL  CP  Unavailable



                                                                



Allergies

          No Known Allergies                                                   
                                     



Problems

          





 Problem Type  Condition  Code  Onset Dates  Condition Status

 

 Problem  Essential hypertension, benign  401.1     Active

 

 Problem  Abdominal pain, generalized  789.07     Active

 

 Problem  Hematuria, unspecified  599.70     Active

 

 Problem  Other and unspecified bipolar disorders  296.89     Active

 

 Problem  Other seborrheic keratosis  702.19     Active

 

 Problem  Lumbago  724.2     Active

 

 Problem  Family history of other cardiovascular diseases  V17.49     Active

 

 Problem  Asthma, unspecified, unspecified status  493.90     Active

 

 Problem  Pain in joint, lower leg  719.46     Active

 

 Problem  Unspecified episodic mood disorder  296.90     Active

 

 Problem  Unspecified gastritis and gastroduodenitis without mention of 
hemorrhage  535.50     Active

 

 Problem  Family history of diabetes mellitus  V18.0     Active



                                                                               
                                                                               
                                        



Medications

          No Known Medications                                                 
                             



Results

          No Known Results                                                     
               



Summary Purpose

          eClinicalWorks Submission

## 2017-11-20 NOTE — XMS REPORT
[facility]

 Created on: 10/11/2016



Dago Morrow

External Reference #: [id number]

: 1975

Sex: Male



Demographics







 Address  [address]

[place]  [address]

 

 Home Phone  [phone]

 

 Preferred Language  Unknown

 

 Marital Status  Unknown

 

 Nondenominational Affiliation  Unknown

 

 Race  White

 

 Ethnic Group  Not  or 





Author







 Author  LILIANA CHURCHILL

 

 Organization  [employer]

 

 Address  Unknown

 

 Phone  Unavailable







Care Team Providers







 Care Team Member Name  Role  Phone

 

 LILIANA CHURCHILL  CP  Unavailable



                                                                



Allergies

          No Known Allergies                                                   
                                     



Problems

          





 Problem Type  Condition  Code  Onset Dates  Condition Status

 

 Problem  Abdominal pain, generalized  789.07     Active

 

 Problem  Pain in joint, lower leg  719.46     Active

 

 Problem  Unspecified episodic mood disorder  296.90     Active

 

 Problem  Type 2 diabetes mellitus with hyperglycemia  E11.65     Active

 

 Problem  Asthma, unspecified, unspecified status  493.90     Active

 

 Problem  Type 2 diabetes mellitus with diabetic polyneuropathy  E11.42     
Active

 

 Problem  Unspecified gastritis and gastroduodenitis without mention of 
hemorrhage  535.50     Active

 

 Problem  Family history of diabetes mellitus  V18.0     Active

 

 Problem  Lumbago  724.2     Active

 

 Problem  Family history of other cardiovascular diseases  V17.49     Active

 

 Problem  Other and unspecified bipolar disorders  296.89     Active

 

 Problem  Other seborrheic keratosis  702.19     Active

 

 Problem  Essential hypertension, benign  401.1     Active

 

 Assessment  Elevated liver enzymes  R74.8     Active

 

 Problem  Hematuria, unspecified  599.70     Active



                                                                               
                                                                               
                                                                      



Medications

          No Known Medications                                                 
                             



Results

          No Known Results                                                     
               



Summary Purpose

          eClinicalWorks Submission

## 2017-11-20 NOTE — XMS REPORT
[facility]

 Created on: 2016



Dago Morrow

External Reference #: [id number]

: 1975

Sex: Male



Demographics







 Address  [address]

[place]  [address]

 

 Home Phone  [phone]

 

 Preferred Language  Unknown

 

 Marital Status  Unknown

 

 Church Affiliation  Unknown

 

 Race  White

 

 Ethnic Group  Not  or 





Author







 Author  LILIANA CHURCHILL

 

 Organization  [employer]

 

 Address  Unknown

 

 Phone  Unavailable







Care Team Providers







 Care Team Member Name  Role  Phone

 

 LILIANA CHURCHILL  CP  Unavailable



                                                                



Allergies

          No Known Allergies                                                   
                                     



Problems

          





 Problem Type  Condition  Code  Onset Dates  Condition Status

 

 Problem  Pain in joint, lower leg  719.46     Active

 

 Problem  Unspecified gastritis and gastroduodenitis without mention of 
hemorrhage  535.50     Active

 

 Problem  Family history of diabetes mellitus  V18.0     Active

 

 Problem  Uncontrolled type 2 diabetes mellitus without complication, without 
long-term current use of insulin  E11.65     Active

 

 Problem  Type 2 diabetes mellitus with diabetic polyneuropathy  E11.42     
Active

 

 Problem  Mood disorder  F39     Active

 

 Problem  Lumbago  724.2     Active

 

 Problem  Family history of other cardiovascular diseases  V17.49     Active

 

 Problem  Type 2 diabetes mellitus with hyperglycemia  E11.65     Active

 

 Problem  Asthma, unspecified, unspecified status  493.90     Active

 

 Problem  Essential hypertension, benign  401.1     Active

 

 Problem  Hematuria, unspecified  599.70     Active

 

 Problem  Other and unspecified bipolar disorders  296.89     Active

 

 Problem  Abdominal pain, generalized  789.07     Active

 

 Problem  Other seborrheic keratosis  702.19     Active

 

 Problem  Unspecified episodic mood disorder  296.90     Active



                                                                               
                                                                               
                                                                                



Medications

          





 Medication  Code System  Code  Instructions  Start Date  End Date  Status  
Dosage

 

 Norco  NDC  [address]-40  7.5-325 MG Orally 3 times a day  2015    
    1 tablet as needed



                                                                              



Results

          No Known Results                                                     
               



Summary Purpose

          [employer] Submission

## 2017-11-20 NOTE — XMS REPORT
[facility]

 Created on: 2017



Dago Morrow

External Reference #: [id number]

: 1975

Sex: Male



Demographics







 Address  [address]

[place]  [address]

 

 Preferred Language  Unknown

 

 Marital Status  Unknown

 

 Anabaptist Affiliation  Unknown

 

 Race  Unknown

 

 Ethnic Group  Unknown





Author







 Author  LILIANA CHURCHILL

 

 Organization  [facility]

 

 Address  [address] [place]  [address]



 

 Phone  [phone]







Care Team Providers







 Care Team Member Name  Role  Phone

 

 LILIANA CHURCHILL  Unavailable  [phone]







PROBLEMS







 Type  Condition  ICD9-CM Code  ADF36-QY Code  Onset Dates  Condition Status  
SNOMED Code

 

 Problem  Mood disorder     F39     Active  66021625

 

 Problem  Anxiety disorder, unspecified     F41.9     Active  219150224

 

 Problem  Other chronic pain     G89.29     Active  65435570

 

 Problem  Type 2 diabetes mellitus with hyperglycemia     E11.65     Active  
507257963044095

 

 Problem  Type 2 diabetes mellitus with diabetic polyneuropathy     E11.42     
Active  13824949

 

 Problem  Uncontrolled type 2 diabetes mellitus without complication, without 
long-term current use of insulin     E11.65     Active  799923024

 

 Problem  Diabetic polyneuropathy associated with type 2 diabetes mellitus     
E11.42     Active  73708705

 

 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  
765728168

 

 Problem  Chronic obstructive pulmonary disease, unspecified COPD type     
J44.9     Active  03764282

 

 Problem  Anxiety     F41.9     Active  54516488

 

 Problem  Social phobia     F40.10     Active  84189933

 

 Problem  Major depression, melancholic type     F32.9     Active  799743320







ALLERGIES

No Information



SOCIAL HISTORY

Never Assessed



PLAN OF CARE





VITAL SIGNS





MEDICATIONS







 Medication  Instructions  Dosage  Frequency  Start Date  End Date  Duration  
Status

 

 True Metrix Blood Glucose Test -  In Vitro 2 times a day  test blood sugar  
12h  15 Sep, 2016     90 days  Active







RESULTS

No Results



PROCEDURES

No Known procedures



IMMUNIZATIONS

No Known Immunizations



MEDICAL (GENERAL) HISTORY







 Type  Description  Date

 

 Medical History  hypertension   

 

 Medical History  asthma   

 

 Medical History  depression and mood disorder   

 

 Medical History  chronic back pain   

 

 Surgical History  angiogram(heart cath)  2016

 

 Hospitalization History  pancreatitis  

 

 Hospitalization History  ER visit for abdominal crampintg  2015

## 2017-11-20 NOTE — XMS REPORT
[facility]

 Created on: 2016



Dago Morrow

External Reference #: [id number]

: 1975

Sex: Male



Demographics







 Address  [address]

[place]  [address]

 

 Home Phone  [phone]

 

 Preferred Language  Unknown

 

 Marital Status  Unknown

 

 Orthodoxy Affiliation  Unknown

 

 Race  White

 

 Ethnic Group  Not  or 





Author







 LILIANA Steret

 

 [facility]  [employer]

 

 Address  Unknown

 

 Phone  Unavailable







Care Team Providers







 Care Team Member Name  Role  Phone

 

 LILIANA CHURCHILL  CP  Unavailable



                                                                



Allergies, Adverse Reactions, Alerts

          





 Substance  Reaction  Event Type

 

 N.K.D.A.  Info Not Available  Non Drug Allergy



                                                                               
         



Problems

          





 Problem Type  Condition  Code  Onset Dates  Condition Status

 

 Problem  Abdominal pain, generalized  789.07     Active

 

 Problem  Pain in joint, lower leg  719.46     Active

 

 Problem  Unspecified episodic mood disorder  296.90     Active

 

 Problem  Type 2 diabetes mellitus with hyperglycemia  E11.65     Active

 

 Problem  Asthma, unspecified, unspecified status  493.90     Active

 

 Problem  Type 2 diabetes mellitus with diabetic polyneuropathy  E11.42     
Active

 

 Problem  Unspecified gastritis and gastroduodenitis without mention of 
hemorrhage  535.50     Active

 

 Problem  Family history of diabetes mellitus  V18.0     Active

 

 Problem  Lumbago  724.2     Active

 

 Problem  Family history of other cardiovascular diseases  V17.49     Active

 

 Problem  Other and unspecified bipolar disorders  296.89     Active

 

 Problem  Other seborrheic keratosis  702.19     Active

 

 Assessment  Type 2 diabetes mellitus with hyperglycemia  E11.65     Active

 

 Problem  Essential hypertension, benign  401.1     Active

 

 Assessment  Type 2 diabetes mellitus with diabetic polyneuropathy  E11.42     
Active

 

 Problem  Hematuria, unspecified  599.70     Active



                                                                               
                                                                               
                                                                                



Medications

          





 Medication  Code System  Code  Instructions  Start Date  End Date  Status  
Dosage

 

 Prozac  NDC  [address]-63  10 mg Orally Once a day           1 capsule

 

 Albuterol Sulfate  NDC  [address]-61  90 mcg/actuation  every 6 hrs  Oct 01, 
2014        inhale 2 puffs

 

 Promethazine HCl  NDC  49787714461  25 MG Orally 3 times a day PRN           1 
tablet as needed

 

 Breo Ellipta  NDC  [address]-10  100-25 MCG/INH Inhalation Once a day          1 puff

 

 Lorazepam  NDC  [address]-71  0.5 MG Orally Once a day  Aug 05, 2015        1 
tablet as needed

 

 Lisinopril-Hydrochlorothiazide  NDC  11003960109  20-25 MG  Once a day        
   1 tablet

 

 Protonix  NDC  [address]-09  40 mg Orally Once a day           1 tablet

 

 Diclofenac Sodium  NDC  18749767150  75 MG Orally Twice a day           1 
tablet

 

 Neurontin  NDC  [address]-01  100 MG Orally Three times a day  2016 
       1 capsule

 

 Sucralfate  NDC  71372128978  1 GM             TAKE ONE TABELT BY MOUTH  
BEFORE MEALS AND AT BEDTIME

 

 MiraLax  NDC  [address]-51  17 gm/dose Orally Once a day  Dec 16, 2015        
1 capful mixed with 8 ounces of fluid

 

 Zofran  NDC  [address]-04  8 MG Orally Once a day           1 tablet

 

 Norco  NDC  [address]-19  7.5-325 MG Orally 3 times a day  2015    
    1 tablet as needed

 

 Singulair  NDC  [address]-58  10 mg Orally Once a day           1 tablet at 
bedtime

 

 Cyclobenzaprine HCl  NDC  [address]-44  10 mg Orally 2 times a day           
1 tablet

 

 Metformin HCl  NDC  [address]-96  1000 MG Orally Twice a day  2016  
      1 tablet with meals

 

 Accu-Chek Mehnaz  NDC  0  1 In Vitro 3 times a day, with lancets  2016
        test blood sugar



                                                                               
                                                                               
                                                                               
           



Procedures

          





 Procedure  Coding System  Code  Date

 

 Office Visit, Est Pt., Level 3  CPT-4  27142  2016



                                                                               
                   



Vital Signs

          





 Date/Time:  2016

 

 Cardiac Monitoring Heart Rate  88 bpm

 

 Weight  317 lbs

 

 Height  72 in

 

 Blood Pressure Diastolic  72 mmHg

 

 Blood Pressure Systolic  140 mmHg



                                                                              



Results

          No Known Results                                                     
               



Summary Purpose

          [employer] Submission

## 2017-11-20 NOTE — XMS REPORT
[facility]

 Created on: 2016



Dago Morrow

External Reference #: [id number]

: 1975

Sex: Male



Demographics







 Address  [address]

[place]  [address]

 

 Home Phone  [phone]

 

 Preferred Language  Unknown

 

 Marital Status  Unknown

 

 Mormonism Affiliation  Unknown

 

 Race  White

 

 Ethnic Group  Not  or 





Author







 LILIANA Street

 

 [facility]  [employer]

 

 Address  Unknown

 

 Phone  Unavailable







Care Team Providers







 Care Team Member Name  Role  Phone

 

 LILIANA CHURCHILL  CP  Unavailable



                                                                



Allergies, Adverse Reactions, Alerts

          





 Substance  Reaction  Event Type

 

 N.K.D.A.  Info Not Available  Non Drug Allergy



                                                                               
         



Problems

          





 Problem Type  Condition  Code  Onset Dates  Condition Status

 

 Problem  Abdominal pain, generalized  789.07     Active

 

 Problem  Pain in joint, lower leg  719.46     Active

 

 Problem  Unspecified episodic mood disorder  296.90     Active

 

 Problem  Type 2 diabetes mellitus with hyperglycemia  E11.65     Active

 

 Problem  Asthma, unspecified, unspecified status  493.90     Active

 

 Problem  Type 2 diabetes mellitus with diabetic polyneuropathy  E11.42     
Active

 

 Problem  Unspecified gastritis and gastroduodenitis without mention of 
hemorrhage  535.50     Active

 

 Problem  Family history of diabetes mellitus  V18.0     Active

 

 Problem  Lumbago  724.2     Active

 

 Problem  Family history of other cardiovascular diseases  V17.49     Active

 

 Problem  Other and unspecified bipolar disorders  296.89     Active

 

 Problem  Other seborrheic keratosis  702.19     Active

 

 Problem  Essential hypertension, benign  401.1     Active

 

 Assessment  Seizures  R56.9     Active

 

 Problem  Hematuria, unspecified  599.70     Active



                                                                               
                                                                               
                                                                      



Medications

          





 Medication  Code System  Code  Instructions  Start Date  End Date  Status  
Dosage

 

 Neurontin  NDC  [address]-31  800 MG Orally Three times a day           1 
capsule

 

 Albuterol Sulfate  NDC  [address]-76  90 mcg/actuation  every 6 hrs  Oct 01, 
2014        inhale 2 puffs

 

 Diclofenac Sodium  NDC  08520045755  75 MG Orally Twice a day           1 
tablet

 

 Accu-Chek Mehnaz  NDC  0  1 In Vitro 2 times a day  2016        test 
blood sugar

 

 Norco  NDC  [address]-84  7.5-325 MG Orally 3 times a day  2015    
    1 tablet as needed

 

 Xanax  NDC  [address]-89  1 MG Orally 2 times a day  Aug 15, 2016        1 
tablet

 

 True Metrix Meter  NDC  0  glucometer  2 times a day  Sept 15, 2016        
test blood sugar

 

 Protonix  NDC  [address]-30  40 mg Orally Once a day           1 tablet

 

 Promethazine HCl  NDC  97824947376  25 MG Orally 3 times a day PRN           1 
tablet as needed

 

 Metformin HCl  NDC  [address]-61  1000 MG Orally Twice a day  2016  
      1 tablet with meals

 

 Singulair  NDC  [address]-27  10 mg Orally Once a day           1 tablet at 
bedtime

 

 MiraLax  NDC  [address]-78  17 gm/dose Orally Once a day  Dec 16, 2015        
1 capful mixed with 8 ounces of fluid

 

 Lisinopril-Hydrochlorothiazide  NDC  07536391830  20-25 MG  Once a day        
   1 tablet

 

 True Metrix Blood Glucose Test  NDC  8528-876252  - In Vitro 2 times a day  
Sept 15, 2016        as directed

 

 Cyclobenzaprine HCl  NDC  [address]-98  10 mg Orally 2 times a day           
1 tablet

 

 Prozac  NDC  [address]-47  20 MG Orally Once a day           1 capsule



                                                                               
                                                                               
                                                                                



Procedures

          





 Procedure  Coding System  Code  Date

 

 Office Visit, Est Pt., Level 3  CPT-4  30713  2016



                                                                               
                   



Vital Signs

          





 Date/Time:  2016

 

 Cardiac Monitoring Heart Rate  100 bpm

 

 Weight  319.3 lbs

 

 Height  72 in

 

 BMI  43.30 Index

 

 Blood Pressure Diastolic  78 mmHg

 

 Blood Pressure Systolic  120 mmHg



                                                                              



Results

          No Known Results                                                     
               



Summary Purpose

          [employer] Submission

## 2018-02-19 ENCOUNTER — HOSPITAL ENCOUNTER (OUTPATIENT)
Dept: HOSPITAL 75 - CARD | Age: 43
End: 2018-02-19
Attending: PHYSICIAN ASSISTANT
Payer: MEDICAID

## 2018-02-19 DIAGNOSIS — I10: ICD-10-CM

## 2018-02-19 DIAGNOSIS — R07.89: Primary | ICD-10-CM

## 2018-02-19 DIAGNOSIS — E66.8: ICD-10-CM

## 2018-02-19 DIAGNOSIS — R06.02: ICD-10-CM

## 2018-02-19 PROCEDURE — 93306 TTE W/DOPPLER COMPLETE: CPT

## 2018-02-21 ENCOUNTER — HOSPITAL ENCOUNTER (OUTPATIENT)
Dept: HOSPITAL 75 - CARD | Age: 43
End: 2018-02-21
Attending: PHYSICIAN ASSISTANT
Payer: MEDICAID

## 2018-02-21 VITALS — BODY MASS INDEX: 39.55 KG/M2 | WEIGHT: 292 LBS | HEIGHT: 72 IN

## 2018-02-21 VITALS — DIASTOLIC BLOOD PRESSURE: 77 MMHG | SYSTOLIC BLOOD PRESSURE: 111 MMHG

## 2018-02-21 DIAGNOSIS — R26.81: ICD-10-CM

## 2018-02-21 DIAGNOSIS — I10: ICD-10-CM

## 2018-02-21 DIAGNOSIS — R06.02: ICD-10-CM

## 2018-02-21 DIAGNOSIS — E66.8: ICD-10-CM

## 2018-02-21 DIAGNOSIS — R07.89: Primary | ICD-10-CM

## 2018-02-21 LAB
ALBUMIN SERPL-MCNC: 4.3 GM/DL (ref 3.2–4.5)
ALP SERPL-CCNC: 71 U/L (ref 40–136)
ALT SERPL-CCNC: 38 U/L (ref 0–55)
BILIRUB SERPL-MCNC: 0.6 MG/DL (ref 0.1–1)
BUN/CREAT SERPL: 20
CALCIUM SERPL-MCNC: 9.7 MG/DL (ref 8.5–10.1)
CHLORIDE SERPL-SCNC: 98 MMOL/L (ref 98–107)
CHOLEST SERPL-MCNC: 204 MG/DL (ref ?–200)
CO2 SERPL-SCNC: 24 MMOL/L (ref 21–32)
CREAT SERPL-MCNC: 0.82 MG/DL (ref 0.6–1.3)
GFR SERPLBLD BASED ON 1.73 SQ M-ARVRAT: > 60 ML/MIN
GLUCOSE SERPL-MCNC: 115 MG/DL (ref 70–105)
HDLC SERPL-MCNC: 40 MG/DL (ref 40–60)
POTASSIUM SERPL-SCNC: 4.2 MMOL/L (ref 3.6–5)
PROT SERPL-MCNC: 7.7 GM/DL (ref 6.4–8.2)
SODIUM SERPL-SCNC: 136 MMOL/L (ref 135–145)
TRIGL SERPL-MCNC: 262 MG/DL (ref ?–150)
VLDLC SERPL CALC-MCNC: 52 MG/DL (ref 5–40)

## 2018-02-21 PROCEDURE — 80053 COMPREHEN METABOLIC PANEL: CPT

## 2018-02-21 PROCEDURE — 36415 COLL VENOUS BLD VENIPUNCTURE: CPT

## 2018-02-21 PROCEDURE — 80061 LIPID PANEL: CPT

## 2018-02-21 PROCEDURE — 78452 HT MUSCLE IMAGE SPECT MULT: CPT

## 2018-02-21 PROCEDURE — 93017 CV STRESS TEST TRACING ONLY: CPT

## 2018-02-21 NOTE — STRESS TEST
DATE OF SERVICE:  02/21/2018



LEXISCAN MYOVIEW STRESS TEST REPORT.



REFERRING PHYSICIAN:

[facility].



TEST DATE:

02/21/2018



Baseline heart rate is 86.  Baseline blood pressure 126/82.  Baseline EKG is

sinus rhythm with no ischemic changes.



SUMMARY:

The patient was injected with 10.77 mCi of technetium-99 Myoview and the resting

images were obtained.  Then, the patient received 0.4 mg of Lexiscan followed by

32.1 mCi of technetium-99 Myoview.  Throughout the test, there were no EKG

changes.



The resting and stress images were reviewed and compared in the short axis,

horizontal long axis, and vertical long axis views.  Review of the images showed

diaphragmatic attenuation with no significant ischemia or infarction.  SSS is 3,

SDS 1, TID value is 0.93.  On the gated images, the left ventricle appeared to

be in normal size with normal contractility.  Calculated ejection fraction is

66%.



CONCLUSION:

1.  The patient tolerated Lexiscan well.

2.  Diaphragmatic attenuation with no significant ischemia or infarction on

SPECT images.

3.  Normal left ventricular size with normal contractility.  Calculated ejection

fraction 66%.





Job ID: [id number]

DocumentID: [id number]

Dictated Date:  02/21/2018 13:56:38

Transcription Date: 02/21/2018 14:59:36

Dictated By: MAAME MCNEILL MD

## 2019-09-06 ENCOUNTER — HOSPITAL ENCOUNTER (EMERGENCY)
Dept: HOSPITAL 75 - ER | Age: 44
Discharge: HOME | End: 2019-09-06
Payer: MEDICAID

## 2019-09-06 VITALS — SYSTOLIC BLOOD PRESSURE: 133 MMHG | DIASTOLIC BLOOD PRESSURE: 81 MMHG

## 2019-09-06 VITALS — HEIGHT: 72 IN | WEIGHT: 291 LBS | BODY MASS INDEX: 39.42 KG/M2

## 2019-09-06 DIAGNOSIS — R06.02: Primary | ICD-10-CM

## 2019-09-06 DIAGNOSIS — K21.9: ICD-10-CM

## 2019-09-06 DIAGNOSIS — F41.9: ICD-10-CM

## 2019-09-06 DIAGNOSIS — I10: ICD-10-CM

## 2019-09-06 DIAGNOSIS — F17.210: ICD-10-CM

## 2019-09-06 DIAGNOSIS — J44.9: ICD-10-CM

## 2019-09-06 DIAGNOSIS — R11.0: ICD-10-CM

## 2019-09-06 DIAGNOSIS — F32.9: ICD-10-CM

## 2019-09-06 DIAGNOSIS — Z82.49: ICD-10-CM

## 2019-09-06 LAB
ALBUMIN SERPL-MCNC: 4.3 GM/DL (ref 3.2–4.5)
ALP SERPL-CCNC: 89 U/L (ref 40–136)
ALT SERPL-CCNC: 20 U/L (ref 0–55)
APTT PPP: YELLOW S
BACTERIA #/AREA URNS HPF: (no result) /HPF
BARBITURATES UR QL: NEGATIVE
BASOPHILS # BLD AUTO: 0 10^3/UL (ref 0–0.1)
BASOPHILS NFR BLD AUTO: 0 % (ref 0–10)
BENZODIAZ UR QL SCN: NEGATIVE
BILIRUB SERPL-MCNC: 0.3 MG/DL (ref 0.1–1)
BILIRUB UR QL STRIP: NEGATIVE
BUN/CREAT SERPL: 11
CALCIUM SERPL-MCNC: 9.7 MG/DL (ref 8.5–10.1)
CHLORIDE SERPL-SCNC: 103 MMOL/L (ref 98–107)
CO2 SERPL-SCNC: 26 MMOL/L (ref 21–32)
COCAINE UR QL: NEGATIVE
CREAT SERPL-MCNC: 0.74 MG/DL (ref 0.6–1.3)
EOSINOPHIL # BLD AUTO: 0.4 10^3/UL (ref 0–0.3)
EOSINOPHIL NFR BLD AUTO: 4 % (ref 0–10)
ERYTHROCYTE [DISTWIDTH] IN BLOOD BY AUTOMATED COUNT: 13.7 % (ref 10–14.5)
FIBRINOGEN PPP-MCNC: CLEAR MG/DL
GFR SERPLBLD BASED ON 1.73 SQ M-ARVRAT: > 60 ML/MIN
GLUCOSE SERPL-MCNC: 175 MG/DL (ref 70–105)
GLUCOSE UR STRIP-MCNC: (no result) MG/DL
HCT VFR BLD CALC: 44 % (ref 40–54)
HGB BLD-MCNC: 14.5 G/DL (ref 13.3–17.7)
KETONES UR QL STRIP: NEGATIVE
LEUKOCYTE ESTERASE UR QL STRIP: NEGATIVE
LYMPHOCYTES # BLD AUTO: 2.4 X 10^3 (ref 1–4)
LYMPHOCYTES NFR BLD AUTO: 23 % (ref 12–44)
MANUAL DIFFERENTIAL PERFORMED BLD QL: NO
MCH RBC QN AUTO: 28 PG (ref 25–34)
MCHC RBC AUTO-ENTMCNC: 33 G/DL (ref 32–36)
MCV RBC AUTO: 87 FL (ref 80–99)
METHADONE UR QL SCN: NEGATIVE
METHAMPHETAMINE SCREEN URINE S: NEGATIVE
MONOCYTES # BLD AUTO: 0.7 X 10^3 (ref 0–1)
MONOCYTES NFR BLD AUTO: 6 % (ref 0–12)
NEUTROPHILS # BLD AUTO: 6.8 X 10^3 (ref 1.8–7.8)
NEUTROPHILS NFR BLD AUTO: 66 % (ref 42–75)
NITRITE UR QL STRIP: NEGATIVE
OPIATES UR QL SCN: NEGATIVE
OXYCODONE UR QL: NEGATIVE
PH UR STRIP: 6.5 [PH] (ref 5–9)
PLATELET # BLD: 375 10^3/UL (ref 130–400)
PMV BLD AUTO: 10.7 FL (ref 7.4–10.4)
POTASSIUM SERPL-SCNC: 4.4 MMOL/L (ref 3.6–5)
PROPOXYPH UR QL: NEGATIVE
PROT SERPL-MCNC: 7.7 GM/DL (ref 6.4–8.2)
PROT UR QL STRIP: NEGATIVE
RBC #/AREA URNS HPF: (no result) /HPF
SODIUM SERPL-SCNC: 137 MMOL/L (ref 135–145)
SP GR UR STRIP: 1 (ref 1.02–1.02)
TRICYCLICS UR QL SCN: NEGATIVE
UROBILINOGEN UR-MCNC: NORMAL MG/DL
WBC # BLD AUTO: 10.3 10^3/UL (ref 4.3–11)
WBC #/AREA URNS HPF: (no result) /HPF

## 2019-09-06 PROCEDURE — 36415 COLL VENOUS BLD VENIPUNCTURE: CPT

## 2019-09-06 PROCEDURE — 81000 URINALYSIS NONAUTO W/SCOPE: CPT

## 2019-09-06 PROCEDURE — 71275 CT ANGIOGRAPHY CHEST: CPT

## 2019-09-06 PROCEDURE — 93005 ELECTROCARDIOGRAM TRACING: CPT

## 2019-09-06 PROCEDURE — 85379 FIBRIN DEGRADATION QUANT: CPT

## 2019-09-06 PROCEDURE — 85025 COMPLETE CBC W/AUTO DIFF WBC: CPT

## 2019-09-06 PROCEDURE — 84484 ASSAY OF TROPONIN QUANT: CPT

## 2019-09-06 PROCEDURE — 80306 DRUG TEST PRSMV INSTRMNT: CPT

## 2019-09-06 PROCEDURE — 96361 HYDRATE IV INFUSION ADD-ON: CPT

## 2019-09-06 PROCEDURE — 96374 THER/PROPH/DIAG INJ IV PUSH: CPT

## 2019-09-06 PROCEDURE — 80053 COMPREHEN METABOLIC PANEL: CPT

## 2019-09-06 PROCEDURE — 96375 TX/PRO/DX INJ NEW DRUG ADDON: CPT

## 2019-09-06 PROCEDURE — 71045 X-RAY EXAM CHEST 1 VIEW: CPT

## 2019-09-06 NOTE — ED COUGH/URI
General


Chief Complaint:  Respiratory Problems


Stated Complaint:  SOA


Nursing Triage Note:  


Pt to ED via EMS with c/o SOB that began at 0100.   enroute to ED.  Pt 


also reports lower abdominal pain.


Sepsis Screen:  No Definite Risk


Source:  patient


Exam Limitations:  no limitations





History of Present Illness


Date Seen by Provider:  Sep 6, 2019


Time Seen by Provider:  10:53


Initial Comments


To ER with shortness of breath that began at 1 AM this morning. States that he 

hasn't been able to sleep for 3 days, not sure why. He did have some chest pain 

earlier, this was lower chest worsened by deep breathing. It's better but still 

present and worse with breathing. He's also had a cough lately. Denies any 

abdominal pain but does report nausea. Was given Zofran in route by ambulance 

but denies improvement.


Timing/Duration:  constant


Severity/Quality:  moderate


Associated Symptoms:  cough, shortness of breath





Allergies and Home Medications


Allergies


Coded Allergies:  


     No Known Drug Allergies (Unverified , 8/2/12)





Home Medications


Albuterol Sulfate 8.5 Gm Hfa.aer.ad, 2 PUFF IH Q6H PRN for SHORTNESS OF BREATH, 

(Reported)


Calcium Polycarbophil 625 Mg Tablet, 625 MG PO HS, (Reported)


Ciprofloxacin HCl 500 Mg Tablet, 500 MG PO BID


   Prescribed by: LILIANA ROSA on 11/11/16 1111


Cyclobenzaprine HCl 10 Mg Tablet, 10 MG PO BID, (Reported)


Diclofenac Sodium 75 Mg Tablet.dr, 75 MG PO BID, (Reported)


Dicyclomine HCl 20 Mg Tablet, 20 MG PO QID PRN for PAIN


   Prescribed by: KENDRA GUALLPA on 10/20/16 2122


Famotidine 20 Mg Tablet, 20 MG PO BID


   Prescribed by: GRACIELA SOTO on 11/20/17 1305


Fluoxetine HCl 10 Mg Capsule, 10 MG PO DAILY, (Reported)


Hctz/Lisinopril 1 Tab Tablet, 1 TAB PO DAILY, (Reported)


Hydrocodone Bit/Acetaminophen 1 Each Tablet, 1 TAB PO Q6H PRN for PAIN, 

(Reported)


Hyoscyamine Sulfate 0.125 Mg Tab.subl, 0.125 MG SL Q6H PRN for SPASMS


   Prescribed by: GRACIELA SOTO on 11/20/17 1305


Lorazepam 0.5 Mg Tablet, 0.5 MG PO HS, (Reported)


Lorazepam 1 Mg Tablet, 1 MG PO HS PRN for INSOMNIA


   Prescribed by: KAITLIN ALDANA on 9/6/19 1258


Metoclopramide HCl 5 Mg Tablet, 5 MG PO TID


   Prescribed by: KAITLIN ALDANA on 9/6/19 1258


Montelukast Sodium 10 Mg Tablet, 10 MG PO HS, (Reported)


Omega 3 Polyunsat Fatty Acids 1,000 Mg Cap, 1,000 MG PO DAILY, (Reported)


Omega 3 Polyunsat Fatty Acids 1,000 Mg Cap, 2,000 MG PO HS, (Reported)


   TAKES 2 (1,000MG) CAPSULES 


Ondansetron HCl 4 Mg Tab, 4 MG PO Q6H PRN for NAUSEA/VOMITING


   Prescribed by: LILIANA ROSA on 11/11/16 1111


Pantoprazole Sodium 40 Mg Tablet.dr, 40 MG PO DAILY, (Reported)


Polyethylene Glycol 3350 17 Gm Powd.pack, 17 GM PO DAILY PRN for CONSTIPATION, 

(Reported)


Sucralfate 1 Gm Tablet, 1 GM PO BID, (Reported)





Patient Home Medication List


Home Medication List Reviewed:  Yes





Review of Systems


Review of Systems


Constitutional:  see HPI


EENTM:  see HPI


Respiratory:  see HPI, cough, short of breath


Cardiovascular:  see HPI, chest pain


Genitourinary:  no symptoms reported


Musculoskeletal:  no symptoms reported


Skin:  no symptoms reported


Psychiatric/Neurological:  No Symptoms Reported


Hematologic/Lymphatic:  No Symptoms Reported


Immunological/Allergic:  no symptoms reported





Past Medical-Social-Family Hx


Patient Social History


Alcohol Use:  Past History


Recreational Drug Use:  No


Smoking Status:  Current Everyday Smoker


Type Used:  Cigarettes


2nd Hand Smoke Exposure:  Yes


Recent Foreign Travel:  No


Contact w/Someone Who Travel:  No


Recent Infectious Disease Expo:  No


Recent Hopitalizations:  No


Physical Abuse:  No


Sexual Abuse:  No





Immunizations Up To Date


Tetanus Booster (TDap):  Less than 5yrs


PED Vaccines UTD:  Yes


Date of Pneumonia Vaccine:  Nov 1, 2011


Date of Influenza Vaccine:  Oct 1, 2011





Seasonal Allergies


Seasonal Allergies:  Yes





Past Medical History


Surgeries:  No


Respiratory:  Yes


Asthma, COPD


Cardiac:  Yes


Hypertension


Neurological:  No


Reproductive Disorders:  No


Sexually Transmitted Disease:  No


HIV/AIDS:  No


Gastrointestinal:  Yes


Gastroesophageal Reflux, Pancreatitis


Musculoskeletal:  No


Endocrine:  No


Loss of Vision:  Denies


Hearing Impairment:  Denies


Cancer:  No


Psychosocial:  No


Anxiety, Depression


Integumentary:  No


Blood Disorders:  No


Adverse Reaction/Blood Tranf:  No





Family Medical History





Alzheimer's disease


Arthritis


Asthma


Cataracts


Completed stroke


Coronary thrombosis


Diabetes mellitus


Drug abuse


Headache disorder


Hypertension


Myocardial infarction


Seizure disorder


Severe allergy





No Family History of:


  AIDS


  Abdominal aortic aneurysm


  [place]'s disease


  Alcoholism


  Aphasia


  Cancer of mouth


  Cardiovascular disease


  Colon cancer


  Congenital disease


  Congenital heart disease


  Cystic fibrosis


  Deafness or hearing loss


  Dementia


  Dysphasia


  Fibrocystic disease of breast


  Gastroenteritis


  Glaucoma


  Hypercholesterolemia


  Infertility


  Kidney disease


  Neoplasm


  Not obtainable due to adoption


  Osteoporosis


  Parkinson's disease


  Prostate cancer


  Psychosocial problem


  Respiratory disorder


  Thyroid disease


  Tuberculosis


  Visual disorder


No Pertinent Family Hx





Physical Exam





Vital Signs - First Documented








 9/6/19





 10:12


 


Temp 98.4


 


Pulse 69


 


Resp 23


 


B/P (MAP) 143/95 (111)


 


Pulse Ox 96


 


O2 Delivery Room Air





Capillary Refill : Less Than 3 Seconds


Height: 6'0"


Weight: 291lbs. 0.0oz. 131.671302hp; 39.6 BMI


Method:Stated


General Appearance:  WD/WN, no apparent distress


Eyes:  Bilateral Eye Normal Inspection, Bilateral Eye PERRL, Bilateral Eye EOMI


HEENT:  PERRL/EOMI, normal ENT inspection


Neck:  non-tender, full range of motion


Respiratory:  normal breath sounds, no respiratory distress, no accessory muscle

use


Cardiovascular:  regular rate, rhythm, no murmur


Gastrointestinal:  normal bowel sounds, non tender, soft


Extremities:  normal range of motion, non-tender


Neurologic/Psychiatric:  alert, normal mood/affect, oriented x 3


Skin:  normal color, warm/dry





Progress/Results/Core Measures


Suspected Sepsis


Recent Fever Within 48 Hours:  No


Infection Criteria Present:  None


New/Unexplained  Altered Menta:  No


Sepsis Screen:  No Definite Risk


SIRS


Temperature:98.4 


Pulse: 69 


Respiratory Rate: 23


 


Laboratory Tests


9/6/19 10:26: White Blood Count 10.3


Blood Pressure 143 /95 


Mean: 111


 





Laboratory Tests


9/6/19 10:26: Platelet Count 375


9/6/19 11:18: 


Creatinine 0.74, Total Bilirubin 0.3








Results/Orders


Lab Results





Laboratory Tests








Test


 9/6/19


10:26 9/6/19


11:18 Range/Units


 


 


White Blood Count


 10.3 


 


 4.3-11.0


10^3/uL


 


Red Blood Count


 5.12 


 


 4.35-5.85


10^6/uL


 


Hemoglobin 14.5   13.3-17.7  G/DL


 


Hematocrit 44   40-54  %


 


Mean Corpuscular Volume 87   80-99  FL


 


Mean Corpuscular Hemoglobin 28   25-34  PG


 


Mean Corpuscular Hemoglobin


Concent 33 


 


 32-36  G/DL





 


Red Cell Distribution Width 13.7   10.0-14.5  %


 


Platelet Count


 375 


 


 130-400


10^3/uL


 


Mean Platelet Volume 10.7 H  7.4-10.4  FL


 


Neutrophils (%) (Auto) 66   42-75  %


 


Lymphocytes (%) (Auto) 23   12-44  %


 


Monocytes (%) (Auto) 6   0-12  %


 


Eosinophils (%) (Auto) 4   0-10  %


 


Basophils (%) (Auto) 0   0-10  %


 


Neutrophils # (Auto) 6.8   1.8-7.8  X 10^3


 


Lymphocytes # (Auto) 2.4   1.0-4.0  X 10^3


 


Monocytes # (Auto) 0.7   0.0-1.0  X 10^3


 


Eosinophils # (Auto)


 0.4 H


 


 0.0-0.3


10^3/uL


 


Basophils # (Auto)


 0.0 


 


 0.0-0.1


10^3/uL


 


D-Dimer


 


 0.54 H


 0.00-0.49


UG/ML


 


Sodium Level  137  135-145  MMOL/L


 


Potassium Level  4.4  3.6-5.0  MMOL/L


 


Chloride Level  103    MMOL/L


 


Carbon Dioxide Level  26  21-32  MMOL/L


 


Anion Gap  8  5-14  MMOL/L


 


Blood Urea Nitrogen  8  7-18  MG/DL


 


Creatinine


 


 0.74 


 0.60-1.30


MG/DL


 


Estimat Glomerular Filtration


Rate 


 > 60 


  





 


BUN/Creatinine Ratio  11   


 


Glucose Level  175 H   MG/DL


 


Calcium Level  9.7  8.5-10.1  MG/DL


 


Corrected Calcium  9.5  8.5-10.1  MG/DL


 


Total Bilirubin  0.3  0.1-1.0  MG/DL


 


Aspartate Amino Transf


(AST/SGOT) 


 17 


 5-34  U/L





 


Alanine Aminotransferase


(ALT/SGPT) 


 20 


 0-55  U/L





 


Alkaline Phosphatase  89    U/L


 


Troponin I  < 0.028  <0.028  NG/ML


 


Total Protein  7.7  6.4-8.2  GM/DL


 


Albumin  4.3  3.2-4.5  GM/DL








My Orders





Orders - KAITLIN ALDANA


Troponin I (9/6/19 10:52)


Ekg Tracing (9/6/19 10:52)


Fibrin Degradation Products (9/6/19 10:52)


Cbc With Automated Diff (9/6/19 10:52)


Comprehensive Metabolic Panel (9/6/19 10:52)


Ed Iv/Invasive Line Start (9/6/19 10:52)


Ns Iv 500 Ml (Sodium Chloride 0.9%) (9/6/19 11:00)


Promethazine Injection (Phenergan Injec (9/6/19 11:00)


Chest 1 View, Ap/Pa Only (9/6/19 11:14)


Diphenhydramine Injection (Benadryl Inje (9/6/19 11:45)


Ct Angio Chest W (9/6/19 11:52)


Iohexol Injection (Omnipaque 350 Mg/Ml 1 (9/6/19 12:30)


Received Contrast (Hold Metformin- Contr (9/6/19 12:30)


Ns (Ivpb) (Sodium Chloride 0.9% Ivpb Bag (9/6/19 12:30)


Ua Culture If Indicated (9/6/19 12:58)


Drug Screen Stat (Urine) (9/6/19 12:58)


Metoclopramide Injection (Reglan Injecti (9/6/19 13:00)





Medications Given in ED





Current Medications








 Medications  Dose


 Ordered  Sig/Nadeen


 Route  Start Time


 Stop Time Status Last Admin


Dose Admin


 


 Diphenhydramine


 HCl  25 mg  ONCE  ONCE


 IVP  9/6/19 11:45


 9/6/19 11:46 DC 9/6/19 12:01


25 MG


 


 Promethazine HCl  25 mg  ONCE  ONCE


 IVP  9/6/19 11:00


 9/6/19 11:01 DC 9/6/19 11:11


25 MG








Vital Signs/I&O











 9/6/19





 10:12


 


Temp 98.4


 


Pulse 69


 


Resp 23


 


B/P (MAP) 143/95 (111)


 


Pulse Ox 96


 


O2 Delivery Room Air





Capillary Refill : Less Than 3 Seconds








Blood Pressure Mean:                    111











Departure


Communication (Admissions)


1251-still reports nausea. Denies any pain at all. States he just wants to go on

home. Labs have been reviewed and are unremarkable as is a CT angio chest. He 

would like something to help him sleep so I'll prescribe lorazepam in addition 

to some Reglan, Reglan chosen because he is a diabetic, Zofran and Phenergan and

Benadryl has not helped and diabetic gastroparesis is certainly within the 

differential..





Impression





   Primary Impression:  


   nausea


   Additional Impression:  


   transient shortness of breath


Disposition:  01 HOME, SELF-CARE


Condition:  Stable





Departure-Patient Inst.


Decision time for Depature:  12:51


Referrals:  


[facility] (PCP)


Primary Care Physician








LILIANA CHURCHILL (Family)


Primary Care Physician


Patient Instructions:  Nausea and Vomiting, Adult





Add. Discharge Instructions:  


1. Return to ER for any concerns


2. Follow-up with your doctor next week


3. All discharge instructions reviewed with patient and/or family. Voiced 

understanding.


Scripts


Lorazepam (Lorazepam) 1 Mg Tablet


1 MG PO HS PRN for INSOMNIA, #5 TAB


   Prov: KAITLIN ALDANA APRN         9/6/19 


Metoclopramide HCl (Reglan) 5 Mg Tablet


5 MG PO TID, #10 TAB


   Prov: KAITLIN ALDANA         9/6/19











KAITLIN ALDANA              Sep 6, 2019 10:54

## 2019-09-06 NOTE — DIAGNOSTIC IMAGING REPORT
INDICATION: Shortness of air and nausea.



TIME OF EXAM: 11:45 a.m.



COMPARISON: Correlation is made with prior study from 05/04/2016.



FINDINGS: The heart appears enlarged but stable. The lungs are

clear. There is no infiltrate or failure. No effusion or

pneumothorax is seen.



IMPRESSION: Cardiomegaly. The study is otherwise unremarkable.



Dictated by: 



  Dictated on workstation # [id number]

## 2020-02-14 ENCOUNTER — HOSPITAL ENCOUNTER (OUTPATIENT)
Dept: HOSPITAL 75 - RAD | Age: 45
End: 2020-02-14
Attending: NURSE PRACTITIONER
Payer: MEDICAID

## 2020-02-14 DIAGNOSIS — M17.11: Primary | ICD-10-CM

## 2020-02-14 PROCEDURE — 73721 MRI JNT OF LWR EXTRE W/O DYE: CPT

## 2020-02-14 NOTE — DIAGNOSTIC IMAGING REPORT
EXAMINATION: Magnetic resonance imaging of the right knee without

intravenous contrast



DATE: February 14, 2020.



COMPARISON: None.



INDICATION: 45-year-old male, right knee pain.



TECHNIQUE: Multiplanar, multisequence non contrast enhanced MR

imaging was accomplished.



FINDINGS:



MENISCI:

The medial meniscus is intact.



The lateral meniscus is intact.



LIGAMENTS AND TENDONS:

The anterior and posterior cruciate ligaments are intact.



The medial collateral ligament is intact.



The iliotibial band, mid third lateral capsular ligament, fibular

collateral ligament, biceps femoris tendon and conjoined tendon

are intact.



The quadriceps tendon and patella ligament are intact.



JOINT:

There is very mild thinning of the patellar cartilage with mild

superficial cartilage irregularity. There is also irregularity of

the cartilage of the femoral trochlea. The medial and lateral

compartment cartilage is grossly intact. There is a trace knee

joint effusion. There is no identified intra-articular body or

prominent synovitis.



BONE:

There is unremarkable bone marrow signal. Specifically, negative

for fracture, osteomyelitis, osteonecrosis, or marrow replacing

process.



BURSAE AND SOFT TISSUES:

There is minimal fluid in the popliteal fossa without sizable

Baker's cyst.



IMPRESSION: 

1. Intact menisci and cruciate ligaments. Additional ligaments

and tendons are intact.

2. Mild patellofemoral compartment osteoarthritis with trace knee

joint effusion.

3. No acute fracture, bone contusion, or other notable bone

marrow signal abnormality.



 



Dictated by: 



  Dictated on workstation # [id number]

## 2020-07-24 ENCOUNTER — HOSPITAL ENCOUNTER (OUTPATIENT)
Dept: HOSPITAL 75 - CARD | Age: 45
End: 2020-07-24
Attending: PHYSICIAN ASSISTANT
Payer: COMMERCIAL

## 2020-07-24 DIAGNOSIS — Z82.49: ICD-10-CM

## 2020-07-24 DIAGNOSIS — I11.9: Primary | ICD-10-CM

## 2020-07-24 DIAGNOSIS — E66.8: ICD-10-CM

## 2020-07-24 PROCEDURE — 93306 TTE W/DOPPLER COMPLETE: CPT

## 2020-09-23 ENCOUNTER — HOSPITAL ENCOUNTER (OUTPATIENT)
Dept: HOSPITAL 75 - RAD | Age: 45
End: 2020-09-23
Attending: NURSE PRACTITIONER
Payer: MEDICAID

## 2020-09-23 DIAGNOSIS — F17.200: Primary | ICD-10-CM

## 2020-09-23 DIAGNOSIS — R60.0: ICD-10-CM

## 2020-09-23 PROCEDURE — 71046 X-RAY EXAM CHEST 2 VIEWS: CPT

## 2020-09-23 NOTE — DIAGNOSTIC IMAGING REPORT
INDICATION: Dyspnea on exertion



PA and lateral chest



Heart size and pulmonary vascularity are normal. Lungs are clear.

There are no effusions or pneumothoraces.



IMPRESSION: Negative chest



Dictated by: 



  Dictated on workstation # [id number]

## 2020-10-08 ENCOUNTER — HOSPITAL ENCOUNTER (OUTPATIENT)
Dept: HOSPITAL 75 - RT | Age: 45
End: 2020-10-08
Attending: INTERNAL MEDICINE
Payer: MEDICAID

## 2020-10-08 DIAGNOSIS — F17.200: ICD-10-CM

## 2020-10-08 DIAGNOSIS — R06.00: Primary | ICD-10-CM

## 2020-10-08 PROCEDURE — 94726 PLETHYSMOGRAPHY LUNG VOLUMES: CPT

## 2020-10-08 PROCEDURE — 94060 EVALUATION OF WHEEZING: CPT

## 2020-10-08 PROCEDURE — 94729 DIFFUSING CAPACITY: CPT

## 2020-11-18 ENCOUNTER — HOSPITAL ENCOUNTER (OUTPATIENT)
Dept: HOSPITAL 75 - LABNPT | Age: 45
End: 2020-11-18
Attending: NURSE PRACTITIONER
Payer: MEDICAID

## 2020-11-18 DIAGNOSIS — Z20.828: ICD-10-CM

## 2020-11-18 DIAGNOSIS — Z01.812: Primary | ICD-10-CM

## 2020-11-18 PROCEDURE — 87635 SARS-COV-2 COVID-19 AMP PRB: CPT

## 2020-11-20 ENCOUNTER — HOSPITAL ENCOUNTER (OUTPATIENT)
Dept: HOSPITAL 75 - SLEEP | Age: 45
LOS: 1 days | Discharge: HOME | End: 2020-11-21
Attending: NURSE PRACTITIONER
Payer: MEDICAID

## 2020-11-20 DIAGNOSIS — G47.50: ICD-10-CM

## 2020-11-20 DIAGNOSIS — G47.33: ICD-10-CM

## 2020-11-20 DIAGNOSIS — Z20.828: ICD-10-CM

## 2020-11-20 DIAGNOSIS — Z01.812: Primary | ICD-10-CM

## 2020-11-20 DIAGNOSIS — G47.10: ICD-10-CM

## 2020-11-20 PROCEDURE — 95811 POLYSOM 6/>YRS CPAP 4/> PARM: CPT

## 2021-05-16 ENCOUNTER — HOSPITAL ENCOUNTER (EMERGENCY)
Dept: HOSPITAL 75 - ER | Age: 46
Discharge: HOME | End: 2021-05-16
Payer: MEDICAID

## 2021-05-16 VITALS — SYSTOLIC BLOOD PRESSURE: 135 MMHG | DIASTOLIC BLOOD PRESSURE: 77 MMHG

## 2021-05-16 VITALS — WEIGHT: 315 LBS | HEIGHT: 71.65 IN | BODY MASS INDEX: 43.13 KG/M2

## 2021-05-16 DIAGNOSIS — J44.9: ICD-10-CM

## 2021-05-16 DIAGNOSIS — Z79.899: ICD-10-CM

## 2021-05-16 DIAGNOSIS — Z87.81: ICD-10-CM
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
## 2021-05-16 DIAGNOSIS — I10: ICD-10-CM

## 2021-05-16 DIAGNOSIS — Z77.22: ICD-10-CM

## 2021-05-16 DIAGNOSIS — S60.221A: Primary | ICD-10-CM

## 2021-05-16 DIAGNOSIS — K21.9: ICD-10-CM

## 2021-05-16 PROCEDURE — 73130 X-RAY EXAM OF HAND: CPT

## 2021-05-16 NOTE — DIAGNOSTIC IMAGING REPORT
INDICATION: Punched a table.



EXAMINATION: Right hand, 05/16/2021.



FINDINGS: 3 views of the hand demonstrate an old 5th metacarpal

fracture. No acute fracture or dislocation is appreciated. Soft

tissues unremarkable.



IMPRESSION: Old 5th metacarpal fracture with no acute process

appreciated. 



Dictated by: 



  Dictated on workstation # [id number]

## 2021-05-16 NOTE — ED UPPER EXTREMITY
General


Stated Complaint:  PUNCHED METAL PICNIC TABLE/R HAND INJ


Source:  patient


Exam Limitations:  no limitations





History of Present Illness


Date Seen by Provider:  May 16, 2021


Time Seen by Provider:  16:49


Initial Comments


Punched a metal picnic table earlier this afternoon and now has right hand pain.


Onset:  just prior to arrival


Severity:  moderate


Pain/Injury Location:  right hand


Method of Injury:  direct blow


Modifying Factors:  Worse With Movement





Allergies and Home Medications


Allergies


Coded Allergies:  


     No Known Drug Allergies (Unverified , 8/2/12)





Home Medications


Albuterol Sulfate 8.5 Gm Hfa.aer.ad, 2 PUFF IH Q6H PRN for SHORTNESS OF BREATH, 

(Reported)


Calcium Polycarbophil 625 Mg Tablet, 625 MG PO HS, (Reported)


Ciprofloxacin HCl 500 Mg Tablet, 500 MG PO BID


   Prescribed by: LILIANA ROSA on 11/11/16 1111


Cyclobenzaprine HCl 10 Mg Tablet, 10 MG PO BID, (Reported)


Diclofenac Sodium 75 Mg Tablet.dr, 75 MG PO BID, (Reported)


Dicyclomine HCl 20 Mg Tablet, 20 MG PO QID PRN for PAIN


   Prescribed by: KENDRA GUALLPA on 10/20/16 2122


Famotidine 20 Mg Tablet, 20 MG PO BID


   Prescribed by: GRACIELA SOTO on 11/20/17 1305


Fluoxetine HCl 10 Mg Capsule, 10 MG PO DAILY, (Reported)


Hctz/Lisinopril 1 Tab Tablet, 1 TAB PO DAILY, (Reported)


Hydrocodone Bit/Acetaminophen 1 Each Tablet, 1 TAB PO Q6H PRN for PAIN, 

(Reported)


Hyoscyamine Sulfate 0.125 Mg Tab.subl, 0.125 MG SL Q6H PRN for SPASMS


   Prescribed by: GRACIELA SOTO on 11/20/17 1305


Lorazepam 0.5 Mg Tablet, 0.5 MG PO HS, (Reported)


Lorazepam 1 Mg Tablet, 1 MG PO HS PRN for INSOMNIA


   Prescribed by: KAITLIN ALDANA on 9/6/19 1258


Metoclopramide HCl 5 Mg Tablet, 5 MG PO TID


   Prescribed by: KAITLIN ALDANA on 9/6/19 1258


Montelukast Sodium 10 Mg Tablet, 10 MG PO HS, (Reported)


Omega 3 Polyunsat Fatty Acids 1,000 Mg Cap, 1,000 MG PO DAILY, (Reported)


Omega 3 Polyunsat Fatty Acids 1,000 Mg Cap, 2,000 MG PO HS, (Reported)


   TAKES 2 (1,000MG) CAPSULES 


Ondansetron HCl 4 Mg Tab, 4 MG PO Q6H PRN for NAUSEA/VOMITING


   Prescribed by: LILIANA ROSA on 11/11/16 1111


Pantoprazole Sodium 40 Mg Tablet.dr, 40 MG PO DAILY, (Reported)


Polyethylene Glycol 3350 17 Gm Powd.pack, 17 GM PO DAILY PRN for CONSTIPATION, 

(Reported)


Sucralfate 1 Gm Tablet, 1 GM PO BID, (Reported)





Patient Home Medication List


Home Medication List Reviewed:  Yes





Review of Systems


Constitutional:  see HPI


EENTM:  see HPI


Respiratory:  no symptoms reported


Cardiovascular:  no symptoms reported


Genitourinary:  no symptoms reported


Musculoskeletal:  see HPI


Skin:  no symptoms reported


Psychiatric/Neurological:  No Symptoms Reported





Past Medical-Social-Family Hx


Patient Social History


Type Used:  Cigarettes


2nd Hand Smoke Exposure:  Yes


Recent Hopitalizations:  No





Immunizations Up To Date


Tetanus Booster (TDap):  Less than 5yrs


PED Vaccines UTD:  Yes


Date of Pneumonia Vaccine:  Nov 1, 2011


Date of Influenza Vaccine:  Oct 1, 2011





Seasonal Allergies


Seasonal Allergies:  Yes





Past Medical History


Surgeries:  No


Respiratory:  Yes


Asthma, COPD


Cardiac:  Yes


Hypertension


Neurological:  No


Reproductive Disorders:  No


Sexually Transmitted Disease:  No


HIV/AIDS:  No


Gastrointestinal:  Yes


Gastroesophageal Reflux, Pancreatitis


Musculoskeletal:  No


Endocrine:  No


Loss of Vision:  Denies


Hearing Impairment:  Denies


Cancer:  No


Psychosocial:  No


Anxiety, Depression


Integumentary:  No


Blood Disorders:  No


Adverse Reaction/Blood Tranf:  No





Family Medical History





Alzheimer's disease


Arthritis


Asthma


Cataracts


Completed stroke


Coronary thrombosis


Diabetes mellitus


Drug abuse


Headache disorder


Hypertension


Myocardial infarction


Seizure disorder


Severe allergy





No Family History of:


  AIDS


  Abdominal aortic aneurysm


  [place]'s disease


  Alcoholism


  Aphasia


  Cancer of mouth


  Cardiovascular disease


  Colon cancer


  Congenital disease


  Congenital heart disease


  Cystic fibrosis


  Deafness or hearing loss


  Dementia


  Dysphasia


  Fibrocystic disease of breast


  Gastroenteritis


  Glaucoma


  Hypercholesterolemia


  Infertility


  Kidney disease


  Neoplasm


  Not obtainable due to adoption


  Osteoporosis


  Parkinson's disease


  Prostate cancer


  Psychosocial problem


  Respiratory disorder


  Thyroid disease


  Tuberculosis


  Visual disorder


No Pertinent Family Hx





Physical Exam


Vital Signs





Vital Signs - First Documented








 5/16/21





 17:03


 


Temp 36.2


 


Pulse 76


 


Resp 16


 


B/P (MAP) 135/77 (96)


 


Pulse Ox 96


 


O2 Delivery Room Air





Capillary Refill :


Height, Weight, BMI


Height: 6'0"


Weight: 291lbs. 0.0oz. 131.895065ig; 39.6 BMI


Method:Stated


General Appearance:  WD/WN, no apparent distress


Respiratory:  no respiratory distress, no accessory muscle use


Shoulder:  normal inspection, non-tender


Elbow/Forearm:  normal inspection, non-tender


Hand:  Right, swelling (No hematoma abrasion or erythema.  There is tenderness 

to palpation over the fourth and fifth metacarpals but I do not see any 

significant swelling.  He is unable to make a fist fully because of pain in the 

fourth and fifth metacarpals)


Neurologic/Tendon:  normal sensation, normal motor functions, normal tendon 

functions


Neurologic/Psychiatric:  alert, normal mood/affect, oriented x 3


Skin:  normal color, warm/dry





Progress/Results/Core Measures


Results/Orders


My Orders





Orders - KAITLIN ALDANA


Hand, Right, 3 Views (5/16/21 16:48)





Vital Signs/I&O











 5/16/21





 17:03


 


Temp 36.2


 


Pulse 76


 


Resp 16


 


B/P (MAP) 135/77 (96)


 


Pulse Ox 96


 


O2 Delivery Room Air











Departure


Communication (Admissions)


He reports that he did have a fracture to one of the bones in his hand after 

punching something years ago that he never had fixed





Impression





   Primary Impression:  


   Contusion of hand


Disposition:  01 HOME, SELF-CARE


Condition:  Stable





Departure-Patient Inst.


Decision time for Depature:  16:50


Referrals:  


LILIANA CHURCHILL (PCP)


Primary Care Physician








SCHWAB,TERRY D MD ZAFUTA,MICHAEL P MD


Patient Instructions:  Contusion (DC)





Add. Discharge Instructions:  


1.  Tylenol and ibuprofen for pain control.  Return to ER for any concerns.  

Follow-up with your doctor next week.











KAITLIN ALDANA             May 16, 2021 16:51

## 2021-10-08 NOTE — XMS REPORT
Pt's daughter states that pt is having a hard time sleeping. She was advised by Ed to take melatonin or unisom but neither of those helped. She is asking if something else can be called in for pt to [address] [facility] in [place]. [facility]

 Created on: 2015



Dago Morrow

External Reference #: [id number]

: 1975

Sex: Male



Demographics







 Address  [address]

[place]  [address]

 

 Home Phone  [phone]

 

 Preferred Language  Unknown

 

 Marital Status  Unknown

 

 [employer] Affiliation  Unknown

 

 Race  Unreported/Refused to Report

 

 Ethnic Group  Not  or 





Author







 Author  LILIANA CHURCHILL

 

 Organization  [employer]

 

 Address  Unknown

 

 Phone  Unavailable







Care Team Providers







 Care Team Member Name  Role  Phone

 

 LILIANA CHURCHILL  CP  Unavailable



                                                                



Allergies

          No Known Allergies                                                   
                                     



Problems

          





 Problem Type  Condition  Code  Onset Dates  Condition Status

 

 Problem  Essential hypertension, benign  401.1     Active

 

 Problem  Abdominal pain, generalized  789.07     Active

 

 Problem  Hematuria, unspecified  599.70     Active

 

 Problem  Other and unspecified bipolar disorders  296.89     Active

 

 Problem  Other seborrheic keratosis  702.19     Active

 

 Problem  Lumbago  724.2     Active

 

 Problem  Family history of other cardiovascular diseases  V17.49     Active

 

 Problem  Asthma, unspecified, unspecified status  493.90     Active

 

 Problem  Pain in joint, lower leg  719.46     Active

 

 Problem  Unspecified episodic mood disorder  296.90     Active

 

 Problem  Unspecified gastritis and gastroduodenitis without mention of 
hemorrhage  535.50     Active

 

 Problem  Family history of diabetes mellitus  V18.0     Active



                                                                               
                                                                               
                                        



Medications

          





 Medication  Code System  Code  Instructions  Start Date  End Date  Status  
Dosage

 

 Prozac  NDC  [address]-48  10 MG Orally Once a day  May 01, 2015        1 
capsule



                                                                              



Results

          No Known Results                                                     
               



Summary Purpose

          [employer] Submission

## 2022-03-28 ENCOUNTER — HOSPITAL ENCOUNTER (EMERGENCY)
Dept: HOSPITAL 75 - ER FS | Age: 47
Discharge: HOME | End: 2022-03-28
Payer: MEDICAID

## 2022-03-28 VITALS — SYSTOLIC BLOOD PRESSURE: 143 MMHG | DIASTOLIC BLOOD PRESSURE: 82 MMHG

## 2022-03-28 VITALS — BODY MASS INDEX: 44.1 KG/M2 | HEIGHT: 70.98 IN | WEIGHT: 315 LBS

## 2022-03-28 DIAGNOSIS — F17.210: ICD-10-CM

## 2022-03-28 DIAGNOSIS — R10.10: Primary | ICD-10-CM

## 2022-03-28 LAB
ALBUMIN SERPL-MCNC: 4.1 GM/DL (ref 3.2–4.5)
ALP SERPL-CCNC: 75 U/L (ref 40–136)
ALT SERPL-CCNC: 35 U/L (ref 0–55)
APTT PPP: YELLOW S
BACTERIA #/AREA URNS HPF: NEGATIVE /HPF
BARBITURATES UR QL: NEGATIVE
BASOPHILS # BLD AUTO: 0 10^3/UL (ref 0–0.1)
BASOPHILS NFR BLD AUTO: 0 % (ref 0–10)
BENZODIAZ UR QL SCN: NEGATIVE
BILIRUB SERPL-MCNC: 0.4 MG/DL (ref 0.1–1)
BILIRUB UR QL STRIP: NEGATIVE
BUN/CREAT SERPL: 19
CALCIUM SERPL-MCNC: 9.5 MG/DL (ref 8.5–10.1)
CHLORIDE SERPL-SCNC: 100 MMOL/L (ref 98–107)
CO2 SERPL-SCNC: 25 MMOL/L (ref 21–32)
COCAINE UR QL: NEGATIVE
CREAT SERPL-MCNC: 0.67 MG/DL (ref 0.6–1.3)
EOSINOPHIL # BLD AUTO: 0.2 10^3/UL (ref 0–0.3)
EOSINOPHIL NFR BLD AUTO: 2 % (ref 0–10)
FIBRINOGEN PPP-MCNC: CLEAR MG/DL
GFR SERPLBLD BASED ON 1.73 SQ M-ARVRAT: 116 ML/MIN
GLUCOSE SERPL-MCNC: 116 MG/DL (ref 70–105)
GLUCOSE UR STRIP-MCNC: (no result) MG/DL
HCT VFR BLD CALC: 49 % (ref 40–54)
HGB BLD-MCNC: 16.1 G/DL (ref 13.3–17.7)
INR PPP: 1 (ref 0.8–1.4)
KETONES UR QL STRIP: (no result)
LEUKOCYTE ESTERASE UR QL STRIP: NEGATIVE
LIPASE SERPL-CCNC: 45 U/L (ref 8–78)
LYMPHOCYTES # BLD AUTO: 2.9 10^3/UL (ref 1–4)
LYMPHOCYTES NFR BLD AUTO: 31 % (ref 12–44)
MANUAL DIFFERENTIAL PERFORMED BLD QL: NO
MCH RBC QN AUTO: 29 PG (ref 25–34)
MCHC RBC AUTO-ENTMCNC: 33 G/DL (ref 32–36)
MCV RBC AUTO: 89 FL (ref 80–99)
METHADONE UR QL SCN: NEGATIVE
METHAMPHETAMINE SCREEN URINE S: NEGATIVE
MONOCYTES # BLD AUTO: 0.7 10^3/UL (ref 0–1)
MONOCYTES NFR BLD AUTO: 8 % (ref 0–12)
NEUTROPHILS # BLD AUTO: 5.4 10^3/UL (ref 1.8–7.8)
NEUTROPHILS NFR BLD AUTO: 59 % (ref 42–75)
NITRITE UR QL STRIP: NEGATIVE
OPIATES UR QL SCN: NEGATIVE
OXYCODONE UR QL: NEGATIVE
PH UR STRIP: 6 [PH] (ref 5–9)
PLATELET # BLD: 325 10^3/UL (ref 130–400)
PMV BLD AUTO: 9.9 FL (ref 9–12.2)
POTASSIUM SERPL-SCNC: 4 MMOL/L (ref 3.6–5)
PROPOXYPH UR QL: NEGATIVE
PROT SERPL-MCNC: 7.3 GM/DL (ref 6.4–8.2)
PROT UR QL STRIP: NEGATIVE
PROTHROMBIN TIME: 13 SEC (ref 12.2–14.7)
RBC #/AREA URNS HPF: (no result) /HPF
SODIUM SERPL-SCNC: 139 MMOL/L (ref 135–145)
SP GR UR STRIP: 1.02 (ref 1.02–1.02)
SQUAMOUS #/AREA URNS HPF: (no result) /HPF
TRICYCLICS UR QL SCN: NEGATIVE
WBC # BLD AUTO: 9.3 10^3/UL (ref 4.3–11)
WBC #/AREA URNS HPF: (no result) /HPF

## 2022-03-28 PROCEDURE — 74177 CT ABD & PELVIS W/CONTRAST: CPT

## 2022-03-28 PROCEDURE — 80306 DRUG TEST PRSMV INSTRMNT: CPT

## 2022-03-28 PROCEDURE — 85025 COMPLETE CBC W/AUTO DIFF WBC: CPT

## 2022-03-28 PROCEDURE — 36415 COLL VENOUS BLD VENIPUNCTURE: CPT

## 2022-03-28 PROCEDURE — 80053 COMPREHEN METABOLIC PANEL: CPT

## 2022-03-28 PROCEDURE — 83690 ASSAY OF LIPASE: CPT

## 2022-03-28 PROCEDURE — 81000 URINALYSIS NONAUTO W/SCOPE: CPT

## 2022-03-28 PROCEDURE — 85610 PROTHROMBIN TIME: CPT

## 2022-03-28 NOTE — DIAGNOSTIC IMAGING REPORT
PROCEDURE: CT abdomen and pelvis with contrast.



TECHNIQUE: Multiple contiguous axial images were obtained through

the abdomen and pelvis after administration of intravenous

contrast. Auto Exposure Controls were utilized during the CT exam

to meet ALARA standards for radiation dose reduction. All CT

scans use one or more of the following dose optimizing

techniques: automated exposure control, MA and/or KvP adjustment

based on patient size and exam type or iterative reconstruction.



INDICATION: Abdominal pain.



COMPARISON: 11/20/2017.



FINDINGS: Mildly prominent interstitial markings are seen in the

lung bases which may represent mild pneumonitis or fibrosis.

There is mild low density throughout the liver. No focal hepatic,

gallbladder, pancreatic, adrenal gland or splenic abnormality is

seen. Kidneys are also unremarkable in appearance. There is no

evidence of free fluid within the abdomen or pelvis. No focal

inflammation is identified. There is no evidence of

pathologically enlarged adenopathy. Unopacified urinary bladder

is unremarkable. Moderate degenerative disc disease is present at

the L5-S1 level.



IMPRESSION: No acute abnormality is detected.



Dictated by: 



  Dictated on workstation # [id number]

## 2022-03-28 NOTE — ED ABDOMINAL PAIN
General


Chief Complaint:  Abdominal/GI Problems


Stated Complaint:  ABD PAIN


Source of Information:  Patient


Exam Limitations:  No Limitations





History of Present Illness


Date Seen by Provider:  Mar 28, 2022


Time Seen by Provider:  12:02


Initial Comments


47yoM with PMH of IDDM, HTN, CHF, and remote history of gastric ulcers and 

pancreatitis (quit drinking alcohol many years ago) coming in due to upper/mid 

abd pain. Started around 11pm last night. Feels like a "knot" in his stomach and

comes and goes. No n/v/d, fever, chest pain, SOA, weakness, numbness, hematuria,

dysuria, rash, or any other concerns. Says it feels somewhat like pancreatitis. 

Nothing seems to make it better or worse. Had a normal bowel movement today.





Allergies and Home Medications


Allergies


Coded Allergies:  


     No Known Drug Allergies (Unverified , 12)





Patient Home Medication List


Home Medication List Reviewed:  Yes


Albuterol Sulfate (Proair Hfa) 8.5 Gm Hfa.aer.ad, 2 PUFF IH Q6H PRN for 

SHORTNESS OF BREATH, (Reported)


   Entered as Reported by: DAVID ROWLAND on 16 08


Calcium Polycarbophil (Fiber) 625 Mg Tablet, 625 MG PO HS, (Reported)


   Entered as Reported by: DAVID ROWLAND on 16 08


Ciprofloxacin HCl (Cipro) 500 Mg Tablet, 500 MG PO BID


   Prescribed by: LILIANA ROSA on 16 1111


Cyclobenzaprine HCl (Cyclobenzaprine HCl) 10 Mg Tablet, 10 MG PO BID, (Reported)


   Entered as Reported by: DAVID ROWLAND on 16 08


Diclofenac Sodium (Diclofenac Sodium) 75 Mg Tablet.dr, 75 MG PO BID, (Reported)


   Entered as Reported by: DAVID ROWLAND on 16 08


Dicyclomine HCl (Bentyl) 20 Mg Tablet, 20 MG PO QID PRN for PAIN


   Prescribed by: KENDRA GUALLPA on 10/20/16 2122


Famotidine (Pepcid) 20 Mg Tablet, 20 MG PO BID


   Prescribed by: GRACIELA SOTO on 17 1305


Fluoxetine HCl (Prozac) 10 Mg Capsule, 10 MG PO DAILY, (Reported)


   Entered as Reported by: DAVID ROWLAND on 16 0844


Hctz/Lisinopril (Lisinopril-Hctz 20-25MG Tab) 1 Tab Tablet, 1 TAB PO DAILY, 

(Reported)


   Entered as Reported by: DARIO KELLY on 14 1457


Hydrocodone Bit/Acetaminophen (Lortab  7.5 Mg Tablet) 1 Each Tablet, 1 TAB PO 

Q6H PRN for PAIN, (Reported)


   Entered as Reported by: DAVID ROWLAND on 16 0847


Hyoscyamine Sulfate (Hyoscyamine Sulfate) 0.125 Mg Tab.subl, 0.125 MG SL Q6H PRN

for SPASMS


   Prescribed by: GRACIELA SOTO on 17 1305


Lorazepam (Lorazepam) 0.5 Mg Tablet, 0.5 MG PO HS, (Reported)


   Entered as Reported by: DAVID ROWLAND on 16 0844


Lorazepam (Lorazepam) 1 Mg Tablet, 1 MG PO HS PRN for INSOMNIA


   Prescribed by: KAITLIN ALDANA on 19 1258


Metoclopramide HCl (Reglan) 5 Mg Tablet, 5 MG PO TID


   Prescribed by: KAITLIN ALDANA on 19 1258


Montelukast Sodium (Montelukast Sodium) 10 Mg Tablet, 10 MG PO HS, (Reported)


   Entered as Reported by: DAVID ROWLAND on 12/10/14 0833


Omega 3 Polyunsat Fatty Acids (Fish Oil 1,000 mg Capsule) 1,000 Mg Cap, 1,000 MG

PO DAILY, (Reported)


   Entered as Reported by: DAVID ROWLAND on 16 0844


Omega 3 Polyunsat Fatty Acids (Fish Oil 1,000 mg Capsule) 1,000 Mg Cap, 2,000 MG

PO HS, (Reported)


   Entered as Reported by: DAVID ROWLAND on 16 0844


Ondansetron HCl (Zofran) 4 Mg Tab, 4 MG PO Q6H PRN for NAUSEA/VOMITING


   Prescribed by: LILIANA ROSA on 16 1111


Pantoprazole Sodium (Pantoprazole Sodium) 40 Mg Tablet.dr, 40 MG PO DAILY, 

(Reported)


   Entered as Reported by: DARIO KELLY on 14 1457


Polyethylene Glycol 3350 (Miralax) 17 Gm Powd.pack, 17 GM PO DAILY PRN for 

CONSTIPATION, (Reported)


   Entered as Reported by: DAVID ROWLAND on 16


Sucralfate (Carafate) 1 Gm Tablet, 1 GM PO BID, (Reported)


   Entered as Reported by: DAVID ROWLAND on 1644





Review of Systems


Review of Systems


Constitutional:  No chills, No fever


EENTM:  No Blurred Vision


Respiratory:  Denies Cough


Cardiovascular:  Denies Chest Pain


Gastrointestinal:  Abdominal Pain; Denies Diarrhea, Denies Nausea, Denies 

Vomiting


Genitourinary:  No Symptoms Reported


Musculoskeletal:  no symptoms reported


Skin:  no symptoms reported


Psychiatric/Neurological:  No Symptoms Reported


Endocrine:  No Symptoms Reported


Hematologic/Lymphatic:  No Symptoms Reported





All Other Systems Reviewed


Negative Unless Noted:  Yes





Past Medical-Social-Family Hx


Patient Social History


Tobacco Use?:  Yes


Tobacco type used:  Cigarettes


Smoking Status:  Current Everyday Smoker


Substance use?:  No (says he quit marijuana years ago)


Alcohol Use?:  No (quit drinking over 20 years ago)





Immunizations Up To Date


Tetanus Booster (TDap):  Less than 5yrs


PED Vaccines UTD:  Yes





Seasonal Allergies


Seasonal Allergies:  Yes





Past Medical History


Surgeries:  No


Respiratory:  Yes


Asthma, COPD


Cardiac:  Yes


Hypertension


Neurological:  No


Reproductive Disorders:  No


Sexually Transmitted Disease:  No


HIV/AIDS:  No


Gastrointestinal:  Yes


Gastroesophageal Reflux, Pancreatitis


Musculoskeletal:  No


Endocrine:  No


Loss of Vision:  Denies


Hearing Impairment:  Denies


Cancer:  No


Psychosocial:  No


Anxiety, Depression


Integumentary:  No


Blood Disorders:  No


Adverse Reaction/Blood Tranf:  No





Family Medical History





Alzheimer's disease


Arthritis


Asthma


Cataracts


Completed stroke


Coronary thrombosis


Diabetes mellitus


Drug abuse


Headache disorder


Hypertension


Myocardial infarction


Seizure disorder


Severe allergy





No Family History of:


  AIDS


  Abdominal aortic aneurysm


  Sherman's disease


  Alcoholism


  Aphasia


  Cancer of mouth


  Cardiovascular disease


  Colon cancer


  Congenital disease


  Congenital heart disease


  Cystic fibrosis


  Deafness or hearing loss


  Dementia


  Dysphasia


  Fibrocystic disease of breast


  Gastroenteritis


  Glaucoma


  Hypercholesterolemia


  Infertility


  Kidney disease


  Neoplasm


  Not obtainable due to adoption


  Osteoporosis


  Parkinson's disease


  Prostate cancer


  Psychosocial problem


  Respiratory disorder


  Thyroid disease


  Tuberculosis


  Visual disorder


No Pertinent Family Hx





Physical Exam


Vital Signs





Vital Signs - First Documented








 3/28/22





 12:07


 


Temp 36.0


 


Pulse 83


 


Resp 18


 


B/P (MAP) 147/86 (106)


 


Pulse Ox 96


 


O2 Delivery Room Air





Capillary Refill :


Height/Weight/BMI


Height: 6'0"


Weight: 291lbs. 0.0oz. [id number]; 46.00 BMI


Method:Stated


General Appearance:  WD/WN, no apparent distress


HEENT:  PERRL/EOMI, normal ENT inspection, pharynx normal


Neck:  non-tender, full range of motion, supple, normal inspection


Respiratory:  chest non-tender, lungs clear, normal breath sounds, no 

respiratory distress, no accessory muscle use


Cardiovascular:  regular rate, rhythm, no edema, no murmur


Gastrointestinal:  normal bowel sounds, non tender, soft; No distended, No 

guarding, No rebound


Extremities:  normal range of motion, non-tender, normal inspection, no pedal 

edema, no calf tenderness, normal capillary refill


Back:  normal inspection, no CVA tenderness, no vertebral tenderness


Neurologic/Psychiatric:  no motor/sensory deficits, alert, normal mood/affect


Skin:  normal color, warm/dry


Lymphatic:  no adenopathy





Progress/Results/Core Measures


Results/Orders


Lab Results





Laboratory Tests








Test


 3/28/22


12:12 3/28/22


12:56 Range/Units


 


 


Urine Color YELLOW    


 


Urine Clarity CLEAR    


 


Urine pH 6.0   5-9  


 


Urine Specific Gravity 1.025 H  1.016-1.022  


 


Urine Protein NEGATIVE   NEGATIVE  


 


Urine Glucose (UA) 2+ H  NEGATIVE  


 


Urine Ketones TRACE H  NEGATIVE  


 


Urine Nitrite NEGATIVE   NEGATIVE  


 


Urine Bilirubin NEGATIVE   NEGATIVE  


 


Urine Urobilinogen 0.2   < = 1.0  MG/DL


 


Urine Leukocyte Esterase NEGATIVE   NEGATIVE  


 


Urine RBC (Auto) TRACE-I H  NEGATIVE  


 


Urine RBC 0-2    /HPF


 


Urine WBC 0-2    /HPF


 


Urine Squamous Epithelial


Cells 0-2 


 


  /HPF





 


Urine Crystals NONE    /LPF


 


Urine Bacteria NEGATIVE    /HPF


 


Urine Casts NONE    /LPF


 


Urine Mucus SMALL H   /LPF


 


Urine Culture Indicated NO    


 


Urine Opiates Screen NEGATIVE   NEGATIVE  


 


Urine Oxycodone Screen NEGATIVE   NEGATIVE  


 


Urine Methadone Screen NEGATIVE   NEGATIVE  


 


Urine Propoxyphene Screen NEGATIVE   NEGATIVE  


 


Urine Barbiturates Screen NEGATIVE   NEGATIVE  


 


Ur Tricyclic Antidepressants


Screen NEGATIVE 


 


 NEGATIVE  





 


Urine Phencyclidine Screen NEGATIVE   NEGATIVE  


 


Urine Amphetamines Screen NEGATIVE   NEGATIVE  


 


Urine Methamphetamines Screen NEGATIVE   NEGATIVE  


 


Urine Benzodiazepines Screen NEGATIVE   NEGATIVE  


 


Urine Cocaine Screen NEGATIVE   NEGATIVE  


 


Urine Cannabinoids Screen NEGATIVE   NEGATIVE  


 


White Blood Count


 


 9.3 


 4.3-11.0


10^3/uL


 


Red Blood Count


 


 5.55 H


 4.30-5.52


10^6/uL


 


Hemoglobin  16.1  13.3-17.7  g/dL


 


Hematocrit  49  40-54  %


 


Mean Corpuscular Volume  89  80-99  fL


 


Mean Corpuscular Hemoglobin  29  25-34  pg


 


Mean Corpuscular Hemoglobin


Concent 


 33 


 32-36  g/dL





 


Red Cell Distribution Width  13.4  10.0-14.5  %


 


Platelet Count


 


 325 


 130-400


10^3/uL


 


Mean Platelet Volume  9.9  9.0-12.2  fL


 


Immature Granulocyte % (Auto)  0   %


 


Neutrophils (%) (Auto)  59  42-75  %


 


Lymphocytes (%) (Auto)  31  12-44  %


 


Monocytes (%) (Auto)  8  0-12  %


 


Eosinophils (%) (Auto)  2  0-10  %


 


Basophils (%) (Auto)  0  0-10  %


 


Neutrophils # (Auto)


 


 5.4 


 1.8-7.8


10^3/uL


 


Lymphocytes # (Auto)


 


 2.9 


 1.0-4.0


10^3/uL


 


Monocytes # (Auto)


 


 0.7 


 0.0-1.0


10^3/uL


 


Eosinophils # (Auto)


 


 0.2 


 0.0-0.3


10^3/uL


 


Basophils # (Auto)


 


 0.0 


 0.0-0.1


10^3/uL


 


Immature Granulocyte # (Auto)


 


 0.0 


 0.0-0.1


10^3/uL


 


Prothrombin Time  13.0  12.2-14.7  SEC


 


INR Comment  1.0  0.8-1.4  


 


Sodium Level  139  135-145  MMOL/L


 


Potassium Level  4.0  3.6-5.0  MMOL/L


 


Chloride Level  100    MMOL/L


 


Carbon Dioxide Level  25  21-32  MMOL/L


 


Anion Gap  14  5-14  MMOL/L


 


Blood Urea Nitrogen  13  7-18  MG/DL


 


Creatinine


 


 0.67 


 0.60-1.30


MG/DL


 


Estimat Glomerular Filtration


Rate 


 116 


  





 


BUN/Creatinine Ratio  19   


 


Glucose Level  116 H   MG/DL


 


Calcium Level  9.5  8.5-10.1  MG/DL


 


Corrected Calcium  9.4  8.5-10.1  MG/DL


 


Total Bilirubin  0.4  0.1-1.0  MG/DL


 


Aspartate Amino Transf


(AST/SGOT) 


 28 


 5-34  U/L





 


Alanine Aminotransferase


(ALT/SGPT) 


 35 


 0-55  U/L





 


Alkaline Phosphatase  75    U/L


 


Total Protein  7.3  6.4-8.2  GM/DL


 


Albumin  4.1  3.2-4.5  GM/DL


 


Lipase  45  8-78  U/L








My Orders





Orders - HEMALATHA ALFONSO MD


Comprehensive Metabolic Panel (3/28/22 12:11)


Lipase (3/28/22 12:11)


Ua Culture If Indicated (3/28/22 12:11)


Ed Iv/Invasive Line Start (3/28/22 12:11)


Cbc With Automated Diff (3/28/22 12:11)


Drug Screen Stat (Urine) (3/28/22 12:11)


Protime With Inr (3/28/22 12:11)


Acetaminophen  Tablet (Tylenol  Tablet) (3/28/22 12:45)


Lidocaine 2% Viscous 15 Ml (Xylocaine Vi (3/28/22 12:45)


Antacid  Suspension (Mylanta  Suspension (3/28/22 12:45)


Hyoscyamine Sl Tablet (Levsin Sl Tablet) (3/28/22 12:45)


Ct Abdomen/Pelvis W (3/28/22 12:41)


Iohexol Injection (Omnipaque 350 Mg/Ml 1 (3/28/22 12:45)


Received Contrast (Hold Metformin- Contr (3/28/22 12:45)


Ns (Ivpb) (Sodium Chloride 0.9% Ivpb Bag (3/28/22 12:45)





Medications Given in ED





Current Medications








 Medications  Dose


 Ordered  Sig/Nadeen


 Route  Start Time


 Stop Time Status Last Admin


Dose Admin


 


 Acetaminophen  1,000 mg  ONCE  ONCE


 PO  3/28/22 12:45


 3/28/22 12:46 DC 3/28/22 13:01


1,000 MG


 


 Al Hydrox/Mg


 Hydrox/Simethicone  30 ml  ONCE  ONCE


 PO  3/28/22 12:45


 3/28/22 12:46 DC 3/28/22 13:00


30 ML


 


 Hyoscyamine


 Sulfate  0.125 mg  ONCE  ONCE


 SL  3/28/22 12:45


 3/28/22 12:46 DC 3/28/22 13:01


0.125 MG


 


 Iohexol  100 ml  ONCE  ONCE


 IV  3/28/22 12:45


 3/28/22 12:46 DC 3/28/22 13:30


100 ML


 


 Lidocaine HCl  15 ml  ONCE  ONCE


 PO  3/28/22 12:45


 3/28/22 12:46 DC 3/28/22 13:00


15 ML


 


 Sodium Chloride  100 ml  ONCE  ONCE


 IV  3/28/22 12:45


 3/28/22 12:46 DC 3/28/22 13:30


100 ML








Vital Signs/I&O











 3/28/22 3/28/22





 12:07 13:01


 


Temp 36.0 36.0


 


Pulse 83 


 


Resp 18 


 


B/P (MAP) 147/86 (106) 


 


Pulse Ox 96 


 


O2 Delivery Room Air 











Progress


Progress Note :  


Progress Note


47-year-old male with above history coming in due to abdominal pain.  ABCs were 

intact and vitals were stable on presentation.  Physical exam with some 

abdominal tenderness but no signs of peritonitis.  He is pointing out the areas 

that he feels like he has some knots in his abdomen where he is having pain.  

This truly just feels like adipose tissue to me I did a point-of-care ultrasound

overlooking the area and it does not fact look like rolls of adipose tissue.  

Basic labs obtained and an IV was placed with no significant abnormalities.  CT 

abdomen pelvis also ordered and there are no significant abnormalities, 

specifically his appendix also looks normal.  He was given Tylenol as well as a 

GI cocktail for pain.  Pain has somewhat improved.  I believe he is stable for 

discharge with outpatient follow-up.  He was sent home with strict return 

precautions.  This could be a gastritis type picture.  I will have him follow-up

with his regular doctor for potential GI follow-up





Diagnostic Imaging





   Diagonstic Imaging:  CT


   Plain Films/CT/US/NM/MRI:  abdomen


Comments


                 [facility].


                                [place], [place]





NAME:   JAYESH RAMON


[facility] REC#:   [id number]


ACCOUNT#:   [id number]


PT STATUS:   REG ER


:   1975


PHYSICIAN:   HEMALATHA ALFONSO MD


ADMIT DATE:   22/ER FS


                                   ***Draft***


Date of Exam:22





CT ABDOMEN/PELVIS W








PROCEDURE: CT abdomen and pelvis with contrast.





TECHNIQUE: Multiple contiguous axial images were obtained through


the abdomen and pelvis after administration of intravenous


contrast. Auto Exposure Controls were utilized during the CT exam


to meet ALARA standards for radiation dose reduction. All CT


scans use one or more of the following dose optimizing


techniques: automated exposure control, MA and/or KvP adjustment


based on patient size and exam type or iterative reconstruction.





INDICATION: Abdominal pain.





COMPARISON: 2017.





FINDINGS: Mildly prominent interstitial markings are seen in the


lung bases which may represent mild pneumonitis or fibrosis.


There is mild low density throughout the liver. No focal hepatic,


gallbladder, pancreatic, adrenal gland or splenic abnormality is


seen. Kidneys are also unremarkable in appearance. There is no


evidence of free fluid within the abdomen or pelvis. No focal


inflammation is identified. There is no evidence of


pathologically enlarged adenopathy. Unopacified urinary bladder


is unremarkable. Moderate degenerative disc disease is present at


the L5-S1 level.





IMPRESSION: No acute abnormality is detected.





  Dictated on workstation # [id number]








Dict:   [id number]


Trans:   22 [id number]


[id number]





Interpreted by:     INGRIS PICKERING MD


Electronically signed by:





Departure


Impression





   Primary Impression:  


   Upper abdominal pain


Disposition:   HOME, SELF-CARE


Condition:  Stable





Departure-Patient Inst.


Decision time for Depature:  14:13


Referrals:  


PADMINI MERINO (PCP)


Primary Care Physician








[facility] [place] [facility]


Primary Care Physician








DEVIN MARES DO


Patient Instructions:  Abdominal Pain, Adult ED





Add. Discharge Instructions:  


Please follow-up with your regular doctor if pain continues.  You can call Dr. Mares's office to schedule an appointment as well.  You may need an upper GI 

scope to see if you have any ulcers in your stomach currently.  I recommend 

buying Maalox over the counter and taking it with some tylenol 1000mg when you 

have pain.


Work/School Note:  Work Release Form   Date Seen in the Emergency Department:  

Mar 28, 2022


   Return to Work:  Mar 29, 2022


   Restrictions:  No Restrictions











HEMALATHA ALFONSO MD          Mar 28, 2022 12:16

## 2022-05-19 ENCOUNTER — HOSPITAL ENCOUNTER (EMERGENCY)
Dept: HOSPITAL 75 - ER FS | Age: 47
Discharge: HOME | End: 2022-05-19
Payer: MEDICAID

## 2022-05-19 VITALS — WEIGHT: 315 LBS | BODY MASS INDEX: 44.1 KG/M2 | HEIGHT: 70.87 IN

## 2022-05-19 VITALS — DIASTOLIC BLOOD PRESSURE: 46 MMHG | SYSTOLIC BLOOD PRESSURE: 133 MMHG

## 2022-05-19 DIAGNOSIS — Z20.822: ICD-10-CM

## 2022-05-19 DIAGNOSIS — R19.7: ICD-10-CM

## 2022-05-19 DIAGNOSIS — Z28.310: ICD-10-CM

## 2022-05-19 DIAGNOSIS — R11.2: Primary | ICD-10-CM

## 2022-05-19 DIAGNOSIS — F17.210: ICD-10-CM

## 2022-05-19 LAB
ALBUMIN SERPL-MCNC: 3.9 GM/DL (ref 3.2–4.5)
ALP SERPL-CCNC: 117 U/L (ref 40–136)
ALT SERPL-CCNC: 26 U/L (ref 0–55)
BASOPHILS # BLD AUTO: 0 10^3/UL (ref 0–0.1)
BASOPHILS NFR BLD AUTO: 0 % (ref 0–10)
BILIRUB SERPL-MCNC: 0.4 MG/DL (ref 0.1–1)
BUN/CREAT SERPL: 21
CALCIUM SERPL-MCNC: 9 MG/DL (ref 8.5–10.1)
CHLORIDE SERPL-SCNC: 99 MMOL/L (ref 98–107)
CO2 SERPL-SCNC: 21 MMOL/L (ref 21–32)
CREAT SERPL-MCNC: 0.73 MG/DL (ref 0.6–1.3)
EOSINOPHIL # BLD AUTO: 0.6 10^3/UL (ref 0–0.3)
EOSINOPHIL NFR BLD AUTO: 5 % (ref 0–10)
GFR SERPLBLD BASED ON 1.73 SQ M-ARVRAT: 113 ML/MIN
GLUCOSE SERPL-MCNC: 136 MG/DL (ref 70–105)
HCT VFR BLD CALC: 50 % (ref 40–54)
HGB BLD-MCNC: 17.3 G/DL (ref 13.3–17.7)
LIPASE SERPL-CCNC: 14 U/L (ref 8–78)
LYMPHOCYTES # BLD AUTO: 2.2 10^3/UL (ref 1–4)
LYMPHOCYTES NFR BLD AUTO: 18 % (ref 12–44)
MANUAL DIFFERENTIAL PERFORMED BLD QL: NO
MCH RBC QN AUTO: 29 PG (ref 25–34)
MCHC RBC AUTO-ENTMCNC: 34 G/DL (ref 32–36)
MCV RBC AUTO: 85 FL (ref 80–99)
MONOCYTES # BLD AUTO: 0.7 10^3/UL (ref 0–1)
MONOCYTES NFR BLD AUTO: 6 % (ref 0–12)
NEUTROPHILS # BLD AUTO: 8.6 10^3/UL (ref 1.8–7.8)
NEUTROPHILS NFR BLD AUTO: 71 % (ref 42–75)
PLATELET # BLD: 254 10^3/UL (ref 130–400)
PMV BLD AUTO: 10.2 FL (ref 9–12.2)
POTASSIUM SERPL-SCNC: 4 MMOL/L (ref 3.6–5)
PROT SERPL-MCNC: 7.2 GM/DL (ref 6.4–8.2)
SODIUM SERPL-SCNC: 135 MMOL/L (ref 135–145)
WBC # BLD AUTO: 12.2 10^3/UL (ref 4.3–11)

## 2022-05-19 PROCEDURE — 71045 X-RAY EXAM CHEST 1 VIEW: CPT

## 2022-05-19 PROCEDURE — 93005 ELECTROCARDIOGRAM TRACING: CPT

## 2022-05-19 PROCEDURE — 87636 SARSCOV2 & INF A&B AMP PRB: CPT

## 2022-05-19 PROCEDURE — 83690 ASSAY OF LIPASE: CPT

## 2022-05-19 PROCEDURE — 85025 COMPLETE CBC W/AUTO DIFF WBC: CPT

## 2022-05-19 PROCEDURE — 36415 COLL VENOUS BLD VENIPUNCTURE: CPT

## 2022-05-19 PROCEDURE — 87804 INFLUENZA ASSAY W/OPTIC: CPT

## 2022-05-19 PROCEDURE — 80053 COMPREHEN METABOLIC PANEL: CPT

## 2022-05-19 PROCEDURE — 84484 ASSAY OF TROPONIN QUANT: CPT

## 2022-05-19 NOTE — ED GI
General


Chief Complaint:  Abdominal/GI Problems


Stated Complaint:  GEN WEAKNESS


Source of Information:  Patient, EMS


Exam Limitations:  No Limitations





History of Present Illness


Date Seen by Provider:  May 19, 2022


Time Seen by Provider:  18:09


Initial Comments


47-year-old male with past medical history of diabetes, CHF, hypertension coming

in via EMS from home due to 1 day of nonbloody nonbilious vomiting and nonbloody

diarrhea.  Been going on since last night.  He believes he vomited least 3 times

today and has had diarrhea numerous times.  Is unsure if he has been around 

anyone sick.  Has never had COVID and is not vaccinated.  Has not had any 

medications for this as of yet today.  He describes some vague chest discomfort 

last night but none right now.  Denies any fever that he knows of, abdominal 

pain, current chest pain, current shortness of breath, focal weakness or 

numbness, headache, vision changes, or any other concerns.  EMS reports his 

glucose was around 150 and vitals were okay.





Allergies and Home Medications


Allergies


Coded Allergies:  


     No Known Drug Allergies (Unverified , 12)





Patient Home Medication List


Home Medication List Reviewed:  Yes


Albuterol Sulfate (Proair Hfa) 8.5 Gm Hfa.aer.ad, 2 PUFF IH Q6H PRN for 

SHORTNESS OF BREATH, (Reported)


   Entered as Reported by: DAVID ROWLAND on 16 08


Calcium Polycarbophil (Fiber) 625 Mg Tablet, 625 MG PO HS, (Reported)


   Entered as Reported by: DAVID ROWLAND on 16 0844


Ciprofloxacin HCl (Cipro) 500 Mg Tablet, 500 MG PO BID


   Prescribed by: LILIANA ROSA on 16 1111


Cyclobenzaprine HCl (Cyclobenzaprine HCl) 10 Mg Tablet, 10 MG PO BID, (Reported)


   Entered as Reported by: DAVID ROWLAND on 16 08


Diclofenac Sodium (Diclofenac Sodium) 75 Mg Tablet.dr, 75 MG PO BID, (Reported)


   Entered as Reported by: DAVID ROWLAND on 16 0844


Dicyclomine HCl (Bentyl) 20 Mg Tablet, 20 MG PO QID PRN for PAIN


   Prescribed by: KENDRA GUALLPA on 10/20/16 2122


Famotidine (Pepcid) 20 Mg Tablet, 20 MG PO BID


   Prescribed by: GRACIELA SOTO on 17 1305


Fluoxetine HCl (Prozac) 10 Mg Capsule, 10 MG PO DAILY, (Reported)


   Entered as Reported by: DAVID ROWLAND on 16 0844


Hctz/Lisinopril (Lisinopril-Hctz 20-25MG Tab) 1 Tab Tablet, 1 TAB PO DAILY, 

(Reported)


   Entered as Reported by: DARIO KELLY on 14 1457


Hydrocodone Bit/Acetaminophen (Lortab  7.5 Mg Tablet) 1 Each Tablet, 1 TAB PO 

Q6H PRN for PAIN, (Reported)


   Entered as Reported by: DAVID ROWLAND on 16 0847


Hyoscyamine Sulfate (Hyoscyamine Sulfate) 0.125 Mg Tab.subl, 0.125 MG SL Q6H PRN

for SPASMS


   Prescribed by: GRACIELA SOTO on 17 1305


Lorazepam (Lorazepam) 0.5 Mg Tablet, 0.5 MG PO HS, (Reported)


   Entered as Reported by: DAVID ROWLAND on 16 0844


Lorazepam (Lorazepam) 1 Mg Tablet, 1 MG PO HS PRN for INSOMNIA


   Prescribed by: KAITLIN ALDANA on 19 1258


Metoclopramide HCl (Reglan) 5 Mg Tablet, 5 MG PO TID


   Prescribed by: KAITLIN ALDANA on 19 1258


Montelukast Sodium (Montelukast Sodium) 10 Mg Tablet, 10 MG PO HS, (Reported)


   Entered as Reported by: DAVID ROWLAND on 12/10/14 0833


Omega 3 Polyunsat Fatty Acids (Fish Oil 1,000 mg Capsule) 1,000 Mg Cap, 1,000 MG

PO DAILY, (Reported)


   Entered as Reported by: DAVID ROWLAND on 16 0844


Omega 3 Polyunsat Fatty Acids (Fish Oil 1,000 mg Capsule) 1,000 Mg Cap, 2,000 MG

PO HS, (Reported)


   Entered as Reported by: DAVID ROWLAND on 16 0844


Ondansetron HCl (Zofran) 4 Mg Tab, 4 MG PO Q6H PRN for NAUSEA/VOMITING


   Prescribed by: LILIANA ROSA on 16 1111


Pantoprazole Sodium (Pantoprazole Sodium) 40 Mg Tablet.dr, 40 MG PO DAILY, 

(Reported)


   Entered as Reported by: DARIO KELLY on 14 1457


Polyethylene Glycol 3350 (Miralax) 17 Gm Powd.pack, 17 GM PO DAILY PRN for 

CONSTIPATION, (Reported)


   Entered as Reported by: DAVID ROWLAND on 16 0897


Sucralfate (Carafate) 1 Gm Tablet, 1 GM PO BID, (Reported)


   Entered as Reported by: DAVID ROWLAND on 16 0844





Review of Systems


Review of Systems


Constitutional:  No chills, No fever


EENTM:  No Blurred Vision


Respiratory:  Denies Cough, Denies Shortness of Air


Cardiovascular:  Denies Chest Pain


Gastrointestinal:  Denies Abdominal Pain; Diarrhea, Nausea, Vomiting


Genitourinary:  No Symptoms Reported


Musculoskeletal:  no symptoms reported


Skin:  no symptoms reported


Psychiatric/Neurological:  No Symptoms Reported


Endocrine:  No Symptoms Reported


Hematologic/Lymphatic:  No Symptoms Reported





All Other Systems Reviewed


Negative Unless Noted:  Yes





Past Medical-Social-Family Hx


Patient Social History


Tobacco Use?:  Yes


Tobacco type used:  Cigarettes


Smoking Status:  Current Everyday Smoker


Use of E-Cig and/or Vaping dev:  No


Substance use?:  No


Alcohol Use?:  No


Pt feels they are or have been:  No





Immunizations Up To Date


Tetanus Booster (TDap):  Less than 5yrs


PED Vaccines UTD:  Yes





Seasonal Allergies


Seasonal Allergies:  Yes





Past Medical History


Surgery/Hospitalization HX:  


DM, HTN, Asthma, CHF, HERNIA,


Surgeries:  No


Respiratory:  Yes


Asthma, COPD


Cardiac:  Yes


Hypertension


Neurological:  No


Reproductive Disorders:  No


Sexually Transmitted Disease:  No


HIV/AIDS:  No


Gastrointestinal:  Yes


Gastroesophageal Reflux, Pancreatitis


Musculoskeletal:  No


Endocrine:  No


Loss of Vision:  Denies


Hearing Impairment:  Denies


Cancer:  No


Psychosocial:  No


Anxiety, Depression


Integumentary:  No


Blood Disorders:  No


Adverse Reaction/Blood Tranf:  No





Family Medical History





Alzheimer's disease


Arthritis


Asthma


Cataracts


Completed stroke


Coronary thrombosis


Diabetes mellitus


Drug abuse


Headache disorder


Hypertension


Myocardial infarction


Seizure disorder


Severe allergy





No Family History of:


  AIDS


  Abdominal aortic aneurysm


  Treasure's disease


  Alcoholism


  Aphasia


  Cancer of mouth


  Cardiovascular disease


  Colon cancer


  Congenital disease


  Congenital heart disease


  Cystic fibrosis


  Deafness or hearing loss


  Dementia


  Dysphasia


  Fibrocystic disease of breast


  Gastroenteritis


  Glaucoma


  Hypercholesterolemia


  Infertility


  Kidney disease


  Neoplasm


  Not obtainable due to adoption


  Osteoporosis


  Parkinson's disease


  Prostate cancer


  Psychosocial problem


  Respiratory disorder


  Thyroid disease


  Tuberculosis


  Visual disorder


No Pertinent Family Hx





Physical Exam


Vital Signs





Vital Signs - First Documented








 22





 18:04


 


Temp 36.6


 


Pulse 84


 


Resp 18


 


B/P (MAP) 156/95 (115)


 


Pulse Ox 96


 


O2 Delivery Room Air





Capillary Refill :


Height/Weight/BMI


Height: 6'0"


Weight: 291lbs. 0.0oz. [id number]; 45.00 BMI


Method:Stated


General Appearance:  WD/WN, no apparent distress


HEENT:  PERRL/EOMI, normal ENT inspection, pharynx normal


Neck:  non-tender, full range of motion, supple, normal inspection


Respiratory:  chest non-tender, lungs clear, normal breath sounds, no 

respiratory distress, no accessory muscle use


Cardiovascular:  regular rate, rhythm, no edema, no murmur


Gastrointestinal:  normal bowel sounds, non tender, soft; No distended, No 

guarding, No rebound; other (Protuberant but soft abdomen)


Extremities:  normal range of motion, non-tender, normal inspection, no pedal 

edema, no calf tenderness, normal capillary refill


Back:  normal inspection, no CVA tenderness


Neurologic/Psychiatric:  no motor/sensory deficits, alert, normal mood/affect


Skin:  normal color, warm/dry


Lymphatic:  no adenopathy





Progress/Results/Core Measures


Results/Orders


Lab Results





Laboratory Tests








Test


 22


18:10 Range/Units


 


 


White Blood Count


 12.2 H


 4.3-11.0


10^3/uL


 


Red Blood Count


 5.92 H


 4.30-5.52


10^6/uL


 


Hemoglobin 17.3  13.3-17.7  g/dL


 


Hematocrit 50  40-54  %


 


Mean Corpuscular Volume 85  80-99  fL


 


Mean Corpuscular Hemoglobin 29  25-34  pg


 


Mean Corpuscular Hemoglobin


Concent 34 


 32-36  g/dL





 


Red Cell Distribution Width 13.3  10.0-14.5  %


 


Platelet Count


 254 


 130-400


10^3/uL


 


Mean Platelet Volume 10.2  9.0-12.2  fL


 


Immature Granulocyte % (Auto) 0   %


 


Neutrophils (%) (Auto) 71  42-75  %


 


Lymphocytes (%) (Auto) 18  12-44  %


 


Monocytes (%) (Auto) 6  0-12  %


 


Eosinophils (%) (Auto) 5  0-10  %


 


Basophils (%) (Auto) 0  0-10  %


 


Neutrophils # (Auto)


 8.6 H


 1.8-7.8


10^3/uL


 


Lymphocytes # (Auto)


 2.2 


 1.0-4.0


10^3/uL


 


Monocytes # (Auto)


 0.7 


 0.0-1.0


10^3/uL


 


Eosinophils # (Auto)


 0.6 H


 0.0-0.3


10^3/uL


 


Basophils # (Auto)


 0.0 


 0.0-0.1


10^3/uL


 


Immature Granulocyte # (Auto)


 0.0 


 0.0-0.1


10^3/uL


 


Sodium Level 135  135-145  MMOL/L


 


Potassium Level 4.0  3.6-5.0  MMOL/L


 


Chloride Level 99    MMOL/L


 


Carbon Dioxide Level 21  21-32  MMOL/L


 


Anion Gap 15 H 5-14  MMOL/L


 


Blood Urea Nitrogen 15  7-18  MG/DL


 


Creatinine


 0.73 


 0.60-1.30


MG/DL


 


Estimat Glomerular Filtration


Rate 113 


  





 


BUN/Creatinine Ratio 21   


 


Glucose Level 136 H   MG/DL


 


Calcium Level 9.0  8.5-10.1  MG/DL


 


Corrected Calcium 9.1  8.5-10.1  MG/DL


 


Total Bilirubin 0.4  0.1-1.0  MG/DL


 


Aspartate Amino Transf


(AST/SGOT) 19 


 5-34  U/L





 


Alanine Aminotransferase


(ALT/SGPT) 26 


 0-55  U/L





 


Alkaline Phosphatase 117    U/L


 


Troponin I < 0.30  <0.30  NG/ML


 


Total Protein 7.2  6.4-8.2  GM/DL


 


Albumin 3.9  3.2-4.5  GM/DL


 


Lipase 14  8-78  U/L








My Orders





Orders - HEMALATHA ALFONSO MD


Cbc With Automated Diff (22 18:11)


Comprehensive Metabolic Panel (22 18:11)


Lipase (22 18:11)


Troponin I Fs (22 18:11)


Influenza A & B Antigens (22 18:11)


Ed Iv/Invasive Line Start (22 18:11)


Ekg Tracing (22 18:11)


Covid 19 Inhouse Test (22 18:11)


Chest 1 View Ap/Pa Only (22 18:11)


Ns Iv 500 Ml (Sodium Chloride 0.9%) (22 18:15)


Ondansetron Injection (Zofran Injectio (22 18:15)





Medications Given in ED





Current Medications








 Medications  Dose


 Ordered  Sig/Nadeen


 Route  Start Time


 Stop Time Status Last Admin


Dose Admin


 


 Ondansetron HCl  4 mg  ONCE  ONCE


 IVP  22 18:15


 22 18:16 DC 22 18:27


4 MG


 


 Sodium Chloride  500 ml @ 0


 mls/hr  Q0M ONCE


 IV  22 18:15


 22 18:16 DC 22 18:26


0 MLS/HR








Vital Signs/I&O











 22





 18:04


 


Temp 36.6


 


Pulse 84


 


Resp 18


 


B/P (MAP) 156/95 (115)


 


Pulse Ox 96


 


O2 Delivery Room Air











Progress


Progress Note :  


Progress Note


47-year-old male with above history coming in due to vomiting and diarrhea.  

ABCs were intact and vitals were stable on presentation.  Physical exam 

reassuring with no focal abnormalities including a soft and nontender abdomen.  

IV was placed and basic labs were obtained including cardiac biomarkers given 

the vague chest discomfort last night.  EKG without acute ischemic changes.  He 

was given a gentle bolus of IV fluids as well as Zofran for his symptoms.  COVID

and flu testing sent.





Labs reassuring including normal electrolytes and negative troponin.  Chest x-

ray with no acute abnormalities.  COVID test pending and will come back likely 

tomorrow.  Patient is feeling better after the IV fluids.  I believe he stable 

for discharge with outpatient follow-up.  He was sent home with strict return 

precautions


Initial ECG Impression Date:  May 19, 2022


Initial ECG Impression Time:  18:09


Initial ECG Rate:  83


Initial ECG Rhythm:  Normal Sinus


Comment


Narrow QRS, normal axis, no significant ST changes or T wave abnormalities, 

appears almost identical to prior EKG from 2019





Diagnostic Imaging





   Diagonstic Imaging:  Xray


   Plain Films/CT/US/NM/MRI:  chest


Comments


                 [facility].


                                [place], [place]





NAME:   JAYESH RAMON


[facility] REC#:   [id number]


ACCOUNT#:   [id number]


PT STATUS:   REG ER


:   1975


PHYSICIAN:   HEMALATHA ALFONSO MD


ADMIT DATE:   22/ER FS


                                   ***Draft***


Date of Exam:22





CHEST 1 VIEW AP/PA ONLY








EXAMINATION: Chest 1 view.





HISTORY: Chest pain. Weakness.





COMPARISON: 2020.





FINDINGS: The lung volumes are normal. No focal consolidation is


seen. No large pleural effusion or pneumothorax is seen. The


cardiomediastinal silhouette is normal in size and contour. No


acute osseous abnormality is seen.





IMPRESSION: No acute pleuroparenchymal process. 





  Dictated on workstation # [id number]








Dict:   22 1829


Trans:   22 183


[facility] [id number]





Interpreted by:     MALCOM ROSSI DO


Electronically signed by:





Departure


Impression





   Primary Impression:  


   Vomiting and diarrhea


   Additional Impression:  


   Person under investigation for COVID-19


Disposition:  01 HOME, SELF-CARE


Condition:  Improved





Departure-Patient Inst.


Decision time for Depature:  19:16


Referrals:  


PADMINI MERINO (PCP)


Primary Care Physician








[facility]/CHUCK (Family)


Primary Care Physician


Patient Instructions:  Viral Gastroenteritis





Add. Discharge Instructions:  


You most likely have a virus that is causing vomiting and diarrhea.  This 

typically last 1 to 3 days with most people.  Focus on sips of fluids frequently

to try to stay hydrated.  I sent nausea medicines to your pharmacy you can pick 

up in the morning.  Take ibuprofen and/or Tylenol as needed for fever or pain.  

Follow-up with your regular doctor in the next 3 to 5 days if things are not 

improving.  If your COVID test is positive, someone will call you tomorrow.


Scripts


Ondansetron (Ondansetron Odt) 4 Mg Tab.rapdis


4 MG PO Q6H PRN for NAUSEA/VOMITING-1ST LINE for 5 Days, #20 TAB


   Prov: HEMALATHA ALFONSO MD         22











HEAMLATHA ALFONSO MD          May 19, 2022 18:17

## 2022-05-19 NOTE — DIAGNOSTIC IMAGING REPORT
EXAMINATION: Chest 1 view.



HISTORY: Chest pain. Weakness.



COMPARISON: 09/23/2020.



FINDINGS: The lung volumes are normal. No focal consolidation is

seen. No large pleural effusion or pneumothorax is seen. The

cardiomediastinal silhouette is normal in size and contour. No

acute osseous abnormality is seen.



IMPRESSION: No acute pleuroparenchymal process. 



Dictated by: 



  Dictated on workstation # [id number]

## 2022-12-20 ENCOUNTER — HOSPITAL ENCOUNTER (OUTPATIENT)
Dept: HOSPITAL 75 - RAD FS | Age: 47
End: 2022-12-20
Payer: MEDICAID

## 2022-12-20 DIAGNOSIS — R09.89: Primary | ICD-10-CM

## 2022-12-20 PROCEDURE — 71046 X-RAY EXAM CHEST 2 VIEWS: CPT

## 2022-12-20 NOTE — DIAGNOSTIC IMAGING REPORT
INDICATION:  

Shortness of breath. 



COMPARISON:  

09/23/2020. 



FINDINGS:

The heart size is within normal limits. There is, however, some

borderline central vascular congestion. The perihilar

interstitial markings are prominent and may reflect some mild

perihilar interstitial edema but could be seen in the setting of

bronchitis or a viral pattern. No alveolar consolidation,

effusion, or pneumothorax. 



IMPRESSION:

There are some streaky perihilar interstitial opacities as

discussed above. No consolidating pneumonia or pleural pathology

and no overt failure pattern.



Dictated by: 



  Dictated on workstation # [id number]

## 2022-12-26 ENCOUNTER — HOSPITAL ENCOUNTER (EMERGENCY)
Dept: HOSPITAL 75 - ER FS | Age: 47
Discharge: HOME | End: 2022-12-26
Payer: MEDICAID

## 2022-12-26 VITALS — SYSTOLIC BLOOD PRESSURE: 136 MMHG | DIASTOLIC BLOOD PRESSURE: 88 MMHG

## 2022-12-26 VITALS — HEIGHT: 70.87 IN | WEIGHT: 297.62 LBS | BODY MASS INDEX: 41.67 KG/M2

## 2022-12-26 DIAGNOSIS — F17.210: ICD-10-CM

## 2022-12-26 DIAGNOSIS — K86.1: Primary | ICD-10-CM

## 2022-12-26 DIAGNOSIS — Z28.310: ICD-10-CM

## 2022-12-26 DIAGNOSIS — E66.9: ICD-10-CM

## 2022-12-26 LAB
ALBUMIN SERPL-MCNC: 4.4 GM/DL (ref 3.2–4.5)
ALP SERPL-CCNC: 87 U/L (ref 40–136)
ALT SERPL-CCNC: 22 U/L (ref 0–55)
APTT PPP: YELLOW S
BACTERIA #/AREA URNS HPF: NEGATIVE /HPF
BASOPHILS # BLD AUTO: 0.1 10^3/UL (ref 0–0.1)
BASOPHILS NFR BLD AUTO: 1 % (ref 0–10)
BILIRUB SERPL-MCNC: 0.4 MG/DL (ref 0.1–1)
BILIRUB UR QL STRIP: NEGATIVE
BUN/CREAT SERPL: 13
CALCIUM SERPL-MCNC: 10 MG/DL (ref 8.5–10.1)
CHLORIDE SERPL-SCNC: 97 MMOL/L (ref 98–107)
CO2 SERPL-SCNC: 30 MMOL/L (ref 21–32)
CREAT SERPL-MCNC: 0.7 MG/DL (ref 0.6–1.3)
EOSINOPHIL # BLD AUTO: 0.3 10^3/UL (ref 0–0.3)
EOSINOPHIL NFR BLD AUTO: 2 % (ref 0–10)
FIBRINOGEN PPP-MCNC: CLEAR MG/DL
GFR SERPLBLD BASED ON 1.73 SQ M-ARVRAT: 114 ML/MIN
GLUCOSE SERPL-MCNC: 150 MG/DL (ref 70–105)
GLUCOSE UR STRIP-MCNC: (no result) MG/DL
HCT VFR BLD CALC: 47 % (ref 40–54)
HGB BLD-MCNC: 16.4 G/DL (ref 13.3–17.7)
KETONES UR QL STRIP: NEGATIVE
LEUKOCYTE ESTERASE UR QL STRIP: NEGATIVE
LIPASE SERPL-CCNC: 210 U/L (ref 8–78)
LYMPHOCYTES # BLD AUTO: 3.9 10^3/UL (ref 1–4)
LYMPHOCYTES NFR BLD AUTO: 28 % (ref 12–44)
MANUAL DIFFERENTIAL PERFORMED BLD QL: NO
MCH RBC QN AUTO: 29 PG (ref 25–34)
MCHC RBC AUTO-ENTMCNC: 35 G/DL (ref 32–36)
MCV RBC AUTO: 83 FL (ref 80–99)
MONOCYTES # BLD AUTO: 1.1 10^3/UL (ref 0–1)
MONOCYTES NFR BLD AUTO: 8 % (ref 0–12)
NEUTROPHILS # BLD AUTO: 8.6 10^3/UL (ref 1.8–7.8)
NEUTROPHILS NFR BLD AUTO: 62 % (ref 42–75)
NITRITE UR QL STRIP: NEGATIVE
PH UR STRIP: 7 [PH] (ref 5–9)
PLATELET # BLD: 380 10^3/UL (ref 130–400)
PMV BLD AUTO: 9.4 FL (ref 9–12.2)
POTASSIUM SERPL-SCNC: 4.3 MMOL/L (ref 3.6–5)
PROT SERPL-MCNC: 8 GM/DL (ref 6.4–8.2)
PROT UR QL STRIP: NEGATIVE
RBC #/AREA URNS HPF: (no result) /HPF
SODIUM SERPL-SCNC: 136 MMOL/L (ref 135–145)
SP GR UR STRIP: 1.01 (ref 1.02–1.02)
SQUAMOUS #/AREA URNS HPF: (no result) /HPF
WBC # BLD AUTO: 13.9 10^3/UL (ref 4.3–11)
WBC #/AREA URNS HPF: (no result) /HPF

## 2022-12-26 PROCEDURE — 81000 URINALYSIS NONAUTO W/SCOPE: CPT

## 2022-12-26 PROCEDURE — 36415 COLL VENOUS BLD VENIPUNCTURE: CPT

## 2022-12-26 PROCEDURE — 83880 ASSAY OF NATRIURETIC PEPTIDE: CPT

## 2022-12-26 PROCEDURE — 84484 ASSAY OF TROPONIN QUANT: CPT

## 2022-12-26 PROCEDURE — 99282 EMERGENCY DEPT VISIT SF MDM: CPT

## 2022-12-26 PROCEDURE — 85025 COMPLETE CBC W/AUTO DIFF WBC: CPT

## 2022-12-26 PROCEDURE — 80053 COMPREHEN METABOLIC PANEL: CPT

## 2022-12-26 PROCEDURE — 83690 ASSAY OF LIPASE: CPT

## 2022-12-26 NOTE — ED ABDOMINAL PAIN
General


Chief Complaint:  Abdominal/GI Problems


Stated Complaint:  EPIGASTRIC PAIN


Nursing Triage Note:  


EPIGASTRIC PAIN.  HASNT SLEPT IN 3 DAYS D/T THE PAIN.  NORMAL BM THIS AM.


Source of Information:  Patient





History of Present Illness


Date Seen by Provider:  Dec 26, 2022


Time Seen by Provider:  14:55


Initial Comments


47-year-old male patient with history of COPD, hypertension, CHF, diabetes 

mellitus, GERD, pancreatitis sent from primary care physician office because of 

abdominal pain.  Patient complaining of epigastric pain as a constant sharp pain

with radiation to his back for the last 3 days and rated his pain 8/10.  Patient

states he was not able to sleep because of pain.  Patient had the same episodes 

of pain with previous episodes of pancreatitis and seen by his primary care 

physician today.  Patient denies recent use of alcohol, fever and chills, 

urinary symptoms, diarrhea and constipation, nausea and vomiting.  Patient 

complaining of anorexia.





Allergies and Home Medications


Allergies


Coded Allergies:  


     No Known Drug Allergies (Unverified , 8/2/12)





Patient Home Medication List


Home Medication List Reviewed:  Yes


Albuterol Sulfate (Ventolin Hfa) 8.5 Gm Hfa.aer.ad, 2 PUFF IH Q6H PRN for 

SHORTNESS OF BREATH, (Reported)


   Entered as Reported by: DAVID ROWLAND on 5/4/16 0844


Calcium Polycarbophil (Fiber) 625 Mg Tablet, 625 MG PO HS, (Reported)


   Entered as Reported by: DAVID ROWLAND on 5/4/16 0844


Ciprofloxacin HCl (Cipro) 500 Mg Tablet, 500 MG PO BID


   Prescribed by: LILIANA ROSA on 11/11/16 1111


Cyclobenzaprine HCl (Cyclobenzaprine HCl) 10 Mg Tablet, 10 MG PO BID, (Reported)


   Entered as Reported by: DAVID ROWLAND on 5/4/16 0844


Diclofenac Sodium (Diclofenac Sodium) 75 Mg Tablet.dr, 75 MG PO BID, (Reported)


   Entered as Reported by: DAVID ROWLAND on 5/4/16 0844


Dicyclomine HCl (Bentyl) 20 Mg Tablet, 20 MG PO QID PRN for PAIN


   Prescribed by: KENDRA GUALLPA on 10/20/16 2122


Famotidine (Pepcid) 20 Mg Tablet, 20 MG PO BID


   Prescribed by: GRACIELA SOTO on 11/20/17 1305


Fluoxetine HCl (Prozac) 10 Mg Capsule, 10 MG PO DAILY, (Reported)


   Entered as Reported by: DAVID ROWLAND on 5/4/16 0844


Hctz/Lisinopril (Lisinopril-Hctz 20-25MG Tab) 1 Tab Tablet, 1 TAB PO DAILY, 

(Reported)


   Entered as Reported by: DARIO KELLY on 12/9/14 1457


Hydrocodone Bit/Acetaminophen (Lortab  7.5 Mg Tablet) 1 Each Tablet, 1 TAB PO 

Q6H PRN for PAIN, (Reported)


   Entered as Reported by: DAVID ROWLAND on 5/4/16 0847


Hyoscyamine Sulfate (Hyoscyamine Sulfate) 0.125 Mg Tab.subl, 0.125 MG SL Q6H PRN

for SPASMS


   Prescribed by: GRACIELA SOTO on 11/20/17 1305


Lorazepam (Lorazepam) 0.5 Mg Tablet, 0.5 MG PO HS, (Reported)


   Entered as Reported by: DAVID ROWLAND on 5/4/16 0844


Lorazepam (Lorazepam) 1 Mg Tablet, 1 MG PO HS PRN for INSOMNIA


   Prescribed by: KAITLIN ALDANA on 9/6/19 1258


Metoclopramide HCl (Reglan) 5 Mg Tablet, 5 MG PO TID


   Prescribed by: KAITLIN ALDANA on 9/6/19 1258


Montelukast Sodium (Montelukast Sodium) 10 Mg Tablet, 10 MG PO HS, (Reported)


   Entered as Reported by: DAVID ROWLAND on 12/10/14 0833


Naproxen (Naprosyn) 500 Mg Tablet, 500 MG PO BID PRN for pain


   Prescribed by: Haley shaw on 12/26/22 1651


Omega 3 Polyunsat Fatty Acids (Fish Oil 1,000 mg Capsule) 1,000 Mg Cap, 1,000 MG

PO DAILY, (Reported)


   Entered as Reported by: DAVID ROWLAND on 5/4/16 0844


Omega 3 Polyunsat Fatty Acids (Fish Oil 1,000 mg Capsule) 1,000 Mg Cap, 2,000 MG

PO HS, (Reported)


   Entered as Reported by: DAVID ROWLAND on 5/4/16 0844


Ondansetron (Ondansetron Odt) 4 Mg Tab.rapdis, 4 MG PO Q6H PRN for 

NAUSEA/VOMITING-1ST LINE


   Prescribed by: HEMALATHA ALFONSO on 5/19/22 1917


Ondansetron (Ondansetron Odt) 4 Mg Tab.rapdis, 4 MG PO TID PRN for NAUSEA-1ST 

LINE


   Prescribed by: Haley shaw on 12/26/22 1651


Ondansetron HCl (Zofran) 4 Mg Tab, 4 MG PO Q6H PRN for NAUSEA/VOMITING


   Prescribed by: LILIANA ROSA on 11/11/16 1111


Pantoprazole Sodium (Pantoprazole Sodium) 40 Mg Tablet.dr, 40 MG PO DAILY, 

(Reported)


   Entered as Reported by: DARIO KELLY on 12/9/14 1457


Polyethylene Glycol 3350 (Miralax) 17 Gm Powd.pack, 17 GM PO DAILY PRN for 

CONSTIPATION, (Reported)


   Entered as Reported by: DAVID ROWLAND on 5/4/16 0844


Sucralfate (Carafate) 1 Gm Tablet, 1 GM PO BID, (Reported)


   Entered as Reported by: DAVID ROWLAND on 5/4/16 0844





Review of Systems


Review of Systems


Constitutional:  see HPI


EENTM:  See HPI


Respiratory:  See HPI


Cardiovascular:  See HPI


Gastrointestinal:  See HPI


Genitourinary:  See HPI


Musculoskeletal:  see HPI


Skin:  see HPI


Psychiatric/Neurological:  See HPI


Endocrine:  See HPI


Hematologic/Lymphatic:  See HPI





All Other Systems Reviewed


Negative Unless Noted:  Yes





Past Medical-Social-Family Hx


Patient Social History


Tobacco Use?:  Yes


Tobacco type used:  Cigarettes


Smoking Status:  Current Everyday Smoker


Use of E-Cig and/or Vaping dev:  No


Substance use?:  No


Alcohol Use?:  No


Pt feels they are or have been:  No





Immunizations Up To Date


Tetanus Booster (TDap):  Less than 5yrs


PED Vaccines UTD:  Yes





Seasonal Allergies


Seasonal Allergies:  Yes





Past Medical History


Surgery/Hospitalization HX:  


DM, HTN, Asthma, CHF, HERNIA,


Surgeries:  No


Respiratory:  Yes


Asthma, COPD


Cardiac:  Yes


Hypertension


Neurological:  No


Reproductive Disorders:  No


Sexually Transmitted Disease:  No


HIV/AIDS:  No


Gastrointestinal:  Yes


Gastroesophageal Reflux, Pancreatitis


Musculoskeletal:  No


Endocrine:  No


Loss of Vision:  Denies


Hearing Impairment:  Denies


Cancer:  No


Psychosocial:  No


Anxiety, Depression


Integumentary:  No


Blood Disorders:  No


Adverse Reaction/Blood Tranf:  No





Family Medical History





Alzheimer's disease


Arthritis


Asthma


Cataracts


Completed stroke


Coronary thrombosis


Diabetes mellitus


Drug abuse


Headache disorder


Hypertension


Myocardial infarction


Seizure disorder


Severe allergy





No Family History of:


  AIDS


  Abdominal aortic aneurysm


  Carmichael's disease


  Alcoholism


  Aphasia


  Cancer of mouth


  Cardiovascular disease


  Colon cancer


  Congenital disease


  Congenital heart disease


  Cystic fibrosis


  Deafness or hearing loss


  Dementia


  Dysphasia


  Fibrocystic disease of breast


  Gastroenteritis


  Glaucoma


  Hypercholesterolemia


  Infertility


  Kidney disease


  Neoplasm


  Not obtainable due to adoption


  Osteoporosis


  Parkinson's disease


  Prostate cancer


  Psychosocial problem


  Respiratory disorder


  Thyroid disease


  Tuberculosis


  Visual disorder


No Pertinent Family Hx





Physical Exam


Vital Signs





Vital Signs - First Documented








 12/26/22





 14:45


 


Temp 36.2


 


Pulse 81


 


Resp 16


 


B/P (MAP) 151/86 (107)


 


Pulse Ox 98


 


O2 Delivery Room Air





Capillary Refill : Less Than 3 Seconds


Height/Weight/BMI


Height: 6'0"


Weight: 291lbs. 0.0oz. 131.730016fh; 41.00 BMI


Method:Stated


General Appearance:  WD/WN, mild distress, obese


HEENT:  PERRL/EOMI, normal ENT inspection, pharynx normal


Neck:  non-tender, full range of motion, supple, normal inspection


Respiratory:  chest non-tender, lungs clear, normal breath sounds, no 

respiratory distress, no accessory muscle use


Cardiovascular:  regular rate, rhythm, no edema, no gallop


Gastrointestinal:  normal bowel sounds, soft, no organomegaly, no pulsatile 

mass, guarding (Epigastric)


Extremities:  normal range of motion, non-tender, normal inspection


Back:  normal inspection


Neurologic/Psychiatric:  no motor/sensory deficits


Skin:  normal color





Progress/Results/Core Measures


Results/Orders


Lab Results





Laboratory Tests








Test


 12/26/22


15:16 12/26/22


15:20 Range/Units


 


 


White Blood Count


 13.9 H


 


 4.3-11.0


10^3/uL


 


Red Blood Count


 5.68 H


 


 4.30-5.52


10^6/uL


 


Hemoglobin 16.4   13.3-17.7  g/dL


 


Hematocrit 47   40-54  %


 


Mean Corpuscular Volume 83   80-99  fL


 


Mean Corpuscular Hemoglobin 29   25-34  pg


 


Mean Corpuscular Hemoglobin


Concent 35 


 


 32-36  g/dL





 


Red Cell Distribution Width 12.9   10.0-14.5  %


 


Platelet Count


 380 


 


 130-400


10^3/uL


 


Mean Platelet Volume 9.4   9.0-12.2  fL


 


Immature Granulocyte % (Auto) 0    %


 


Neutrophils (%) (Auto) 62   42-75  %


 


Lymphocytes (%) (Auto) 28   12-44  %


 


Monocytes (%) (Auto) 8   0-12  %


 


Eosinophils (%) (Auto) 2   0-10  %


 


Basophils (%) (Auto) 1   0-10  %


 


Neutrophils # (Auto)


 8.6 H


 


 1.8-7.8


10^3/uL


 


Lymphocytes # (Auto)


 3.9 


 


 1.0-4.0


10^3/uL


 


Monocytes # (Auto)


 1.1 H


 


 0.0-1.0


10^3/uL


 


Eosinophils # (Auto)


 0.3 


 


 0.0-0.3


10^3/uL


 


Basophils # (Auto)


 0.1 


 


 0.0-0.1


10^3/uL


 


Immature Granulocyte # (Auto)


 0.1 


 


 0.0-0.1


10^3/uL


 


Sodium Level 136   135-145  MMOL/L


 


Potassium Level 4.3   3.6-5.0  MMOL/L


 


Chloride Level 97 L    MMOL/L


 


Carbon Dioxide Level 30   21-32  MMOL/L


 


Anion Gap 9   5-14  MMOL/L


 


Blood Urea Nitrogen 9   7-18  MG/DL


 


Creatinine


 0.70 


 


 0.60-1.30


MG/DL


 


Estimat Glomerular Filtration


Rate 114 


 


  





 


BUN/Creatinine Ratio 13    


 


Glucose Level 150 H    MG/DL


 


Calcium Level 10.0   8.5-10.1  MG/DL


 


Corrected Calcium 9.7   8.5-10.1  MG/DL


 


Total Bilirubin 0.4   0.1-1.0  MG/DL


 


Aspartate Amino Transf


(AST/SGOT) 18 


 


 5-34  U/L





 


Alanine Aminotransferase


(ALT/SGPT) 22 


 


 0-55  U/L





 


Alkaline Phosphatase 87     U/L


 


Troponin I < 0.30   <0.30  NG/ML


 


Pro-B-Type Natriuretic Peptide 9.1   <125.0  PG/ML


 


Total Protein 8.0   6.4-8.2  GM/DL


 


Albumin 4.4   3.2-4.5  GM/DL


 


Lipase 210 H  8-78  U/L


 


Urine Color  YELLOW   


 


Urine Clarity  CLEAR   


 


Urine pH  7.0  5-9  


 


Urine Specific Gravity  1.010 L 1.016-1.022  


 


Urine Protein  NEGATIVE  NEGATIVE  


 


Urine Glucose (UA)  TRACE H NEGATIVE  


 


Urine Ketones  NEGATIVE  NEGATIVE  


 


Urine Nitrite  NEGATIVE  NEGATIVE  


 


Urine Bilirubin  NEGATIVE  NEGATIVE  


 


Urine Urobilinogen  0.2  < = 1.0  MG/DL


 


Urine Leukocyte Esterase  NEGATIVE  NEGATIVE  


 


Urine RBC (Auto)  TRACE-I H NEGATIVE  


 


Urine RBC  RARE   /HPF


 


Urine WBC  NONE   /HPF


 


Urine Squamous Epithelial


Cells 


 NONE 


  /HPF





 


Urine Crystals  NONE   /LPF


 


Urine Bacteria  NEGATIVE   /HPF


 


Urine Casts  NONE   /LPF


 


Urine Mucus  NEGATIVE   /LPF


 


Urine Culture Indicated  NO   








My Orders





Orders - HALEY SHAW MD


Comprehensive Metabolic Panel (12/26/22 14:57)


Lipase (12/26/22 14:57)


Ua Culture If Indicated (12/26/22 14:57)


Ed Iv/Invasive Line Start (12/26/22 14:57)


Cbc With Automated Diff (12/26/22 14:57)


Troponin I Fs (12/26/22 14:57)


Probnp Fs (12/26/22 14:57)


Fentanyl  Inj (Sublimaze Injection) (12/26/22 16:00)


Ondansetron Injection (Zofran Injectio (12/26/22 16:00)





Medications Given in ED





Current Medications








 Medications  Dose


 Ordered  Sig/Nadeen


 Route  Start Time


 Stop Time Status Last Admin


Dose Admin


 


 Fentanyl Citrate  50 mcg  ONCE  ONCE


 IVP  12/26/22 16:00


 12/26/22 16:01 DC 12/26/22 15:58


50 MCG


 


 Ondansetron HCl  4 mg  ONCE  ONCE


 IVP  12/26/22 16:00


 12/26/22 16:01 DC 12/26/22 15:57


4 MG








Vital Signs/I&O











 12/26/22 12/26/22





 14:45 16:36


 


Temp 36.2 36.2


 


Pulse 81 73


 


Resp 16 16


 


B/P (MAP) 151/86 (107) 136/88


 


Pulse Ox 98 98


 


O2 Delivery Room Air Room Air














Blood Pressure Mean:                    107











Progress


Progress Note :  


Progress Note


47-year-old male patient with history of episode of pancreatitis sent from PCP 

office with concern for acute pancreatitis after having epigastric pain for 3 

days.  Patient had guarding of epigastric area with a stable vital sign.  

Patient had mild elevation of white count at 13.9 and lipase of 210.  Patient 

treated with fentanyl and Zofran and felt better.  Because of mild elevation of 

lipase plan to discharge patient home with outpatient treatment.  Patient 

advised to take liquid diet for the next 48 hours and follow-up with primary 

care physician in 3 days.  Patient advised to avoid of drinking alcohol.  

Patient informed about test result and plan of care and needs for follow-up and 

all questions was addressed.





Departure


Impression





   Primary Impression:  


   Recurrent pancreatitis


Disposition:  01 HOME, SELF-CARE


Condition:  Improved





Departure-Patient Inst.


Decision time for Depature:  16:49


Referrals:  


DAVID STERN (PCP)


Primary Care Physician








[facility] (Family)


Primary Care Physician


Patient Instructions:  Pancreatitis (DC)





Add. Discharge Instructions:  


No solid food for the next 48 hours, take only liquid diet


Follow-up with your primary care physician in 3 days or return to ER if not 

getting better








All discharge instructions reviewed with patient and/or family. Voiced 

understanding.


Scripts


Naproxen (Naprosyn) 500 Mg Tablet


500 MG PO BID PRN for pain, #14 TAB


   Prov: HALEY SHAW MD         12/26/22 


Ondansetron (Ondansetron Odt) 4 Mg Tab.rapdis


4 MG PO TID PRN for NAUSEA-1ST LINE, #10 TAB


   Prov: HALEY SHAW MD         12/26/22











HALEY SAHW MD             Dec 26, 2022 15:15

## 2023-02-08 ENCOUNTER — HOSPITAL ENCOUNTER (OUTPATIENT)
Dept: HOSPITAL 75 - PREOP | Age: 48
End: 2023-02-08
Attending: SURGERY
Payer: MEDICAID

## 2023-02-08 VITALS — HEIGHT: 70.98 IN | BODY MASS INDEX: 41.3 KG/M2 | WEIGHT: 294.98 LBS

## 2023-02-08 DIAGNOSIS — Z01.818: Primary | ICD-10-CM

## 2023-03-02 ENCOUNTER — HOSPITAL ENCOUNTER (OUTPATIENT)
Dept: HOSPITAL 75 - CARD | Age: 48
End: 2023-03-02
Attending: SURGERY
Payer: MEDICAID

## 2023-03-02 DIAGNOSIS — K21.9: Primary | ICD-10-CM

## 2023-03-02 PROCEDURE — 78227 HEPATOBIL SYST IMAGE W/DRUG: CPT

## 2023-03-02 NOTE — DIAGNOSTIC IMAGING REPORT
Indication: Epigastric pain.



Patient was administered 5.4 mCi technetium 99m Choletec

intravenously and imaging over the abdomen was performed. At 45

minutes, the patient ingested 8 ounces of Ensure and the

gallbladder ejection fraction was calculated.



There is homogeneous uptake of activity by the liver with prompt

excretion of activity into the gallbladder and common duct. There

is normal passage of activity into the small bowel. There does

appear to be some gastric bile reflux present. The gallbladder

ejection fraction is abnormally low at 12.5% (normal values are

35% or greater).



IMPRESSION:

1. Patent cystic duct and common bile duct.

2. Mild gastric bile reflux. 

3. Low gallbladder ejection fraction of 12.5%.



Dictated by: 



  Dictated on workstation # [id number]

## 2023-03-13 ENCOUNTER — HOSPITAL ENCOUNTER (OUTPATIENT)
Dept: HOSPITAL 75 - ENDO | Age: 48
Discharge: HOME | End: 2023-03-13
Attending: SURGERY
Payer: MEDICAID

## 2023-03-13 VITALS — DIASTOLIC BLOOD PRESSURE: 68 MMHG | SYSTOLIC BLOOD PRESSURE: 115 MMHG

## 2023-03-13 VITALS — SYSTOLIC BLOOD PRESSURE: 124 MMHG | DIASTOLIC BLOOD PRESSURE: 62 MMHG

## 2023-03-13 VITALS — HEIGHT: 70.98 IN | WEIGHT: 294.98 LBS | BODY MASS INDEX: 41.3 KG/M2

## 2023-03-13 VITALS — SYSTOLIC BLOOD PRESSURE: 115 MMHG | DIASTOLIC BLOOD PRESSURE: 59 MMHG

## 2023-03-13 VITALS — DIASTOLIC BLOOD PRESSURE: 57 MMHG | SYSTOLIC BLOOD PRESSURE: 123 MMHG

## 2023-03-13 VITALS — SYSTOLIC BLOOD PRESSURE: 115 MMHG | DIASTOLIC BLOOD PRESSURE: 64 MMHG

## 2023-03-13 DIAGNOSIS — K25.9: ICD-10-CM

## 2023-03-13 DIAGNOSIS — E66.01: ICD-10-CM

## 2023-03-13 DIAGNOSIS — Z79.4: ICD-10-CM

## 2023-03-13 DIAGNOSIS — K44.9: ICD-10-CM

## 2023-03-13 DIAGNOSIS — K21.00: ICD-10-CM

## 2023-03-13 DIAGNOSIS — F17.210: ICD-10-CM

## 2023-03-13 DIAGNOSIS — Z79.84: ICD-10-CM

## 2023-03-13 DIAGNOSIS — E11.9: ICD-10-CM

## 2023-03-13 DIAGNOSIS — K29.70: Primary | ICD-10-CM

## 2023-03-13 DIAGNOSIS — K31.89: ICD-10-CM

## 2023-03-13 DIAGNOSIS — Z87.11: ICD-10-CM

## 2023-03-13 DIAGNOSIS — Z28.310: ICD-10-CM

## 2023-03-13 PROCEDURE — 82947 ASSAY GLUCOSE BLOOD QUANT: CPT

## 2023-03-13 NOTE — PROGRESS NOTE-POST OPERATIVE
Post-Operative Progess Note


Surgeon (s)/Assistant (s)


Surgeon


DEVIN MARES DO


Assistant:  none





Pre-Operative Diagnosis


Epigastric pain, hx of gastric ulcers





Post-Operative Diagnosis





Gastritis


Hiatal hernia - small


Retained food - r/o Gastroparesis





Procedure & Operative Findings


Date of Procedure


3/13/23


Procedure Performed/Findings


EGD with biopsy





PROCEDURE NOTE:


After informed consent was obtained, the patient was brought to the endoscopy


suite, placed in bed in left lateral decubitus position.  He was administered


IV sedation by the CRNA who then monitored  vitals the entire time, heart


rate, blood pressure and pulse ox and the scope was inserted down the mouth


through the esophagus into the stomach.  On the way down, noted some mild


esophagitis, took a picture, pushed into the stomach and immediately encountered


a large amount of retained food; took a picture.  Pushed past the antrum into 

the 


duodenum; duodenum looked good.  Pulled back and did a biopsy of the antrum, 


then retroflexed the scope, saw a small hiatal hernia and took a picture of 

this.


Next, pulled the scope into the GE junction and then did a biopsy of the GE 


junction.  Pushed the scope back into the stomach, suctioned all the air out of 


the stomach. At this point pulled the scope up the esophagus and out the mouth. 





The patient tolerated the procedure, and he recovered in endoscopy suite.


Anesthesia Type


IV sedation by CRNA





Estimated Blood Loss


Estimated blood loss (mL):  scant





Specimens/Packing


Specimens Removed


antral bx


GE jxn bx











DEVIN MARES DO               Mar 13, 2023 08:42

## 2023-03-13 NOTE — ANESTHESIA-GENERAL POST-OP
MAC


Patient Condition


Mental Status/LOC:  Same as Preop


Cardiovascular:  Satisfactory


Nausea/Vomiting:  Absent


Respiratory:  Satisfactory


Pain:  Controlled


Complications:  Absent





Post Op Complications


Complications


None





Follow Up Care/Instructions


Patient Instructions


None needed.





Anesthesiology Discharge Order


Discharge Order


Patient is doing well, no complaints, stable vital signs, no apparent adverse 

anesthesia problems.   


No complications reported per nursing.











KRYSTIN BOOTH CRNA              Mar 13, 2023 12:10

## 2023-03-13 NOTE — ENDOSCOPY DISCHARGE INSTRUCT
Endo Procedure/Findings


Findings


1.:  Gastritis


2.:  Hiatal Hernia


3.:  Other Findings (retained food)





Discharge Instructions


-


Activity: You might feel a little sleepy until tomorrow.  This is due to the 

medicine you received to relax you.





Until tomorrow, you should:  


   NOT drive a car, operate machinery or power tools.


   NOT drink any alcoholic beverages.


   NOT make any important decisions or sign importortant papers.





Do not return to work until tomorrow, unless otherwise instructed. Resume 

previous activities tomorrow.





Diet: Start by taking liquids.  If you tolerate liquids, advance to solid food.


1.:  EGD in 3 years





Notify Physician


-


If you experience excessive bleeding, unusual abdominal pain, fever, or chest 

pain, contact your doctor immediately.











DEVIN MARES DO               Mar 13, 2023 08:42

## 2023-04-03 ENCOUNTER — HOSPITAL ENCOUNTER (OUTPATIENT)
Dept: HOSPITAL 75 - PREOP | Age: 48
Discharge: HOME | End: 2023-04-03
Attending: SURGERY
Payer: MEDICAID

## 2023-04-03 VITALS — HEIGHT: 70.98 IN | WEIGHT: 294.98 LBS | BODY MASS INDEX: 41.3 KG/M2

## 2023-04-03 DIAGNOSIS — Z01.818: Primary | ICD-10-CM

## 2023-04-12 ENCOUNTER — HOSPITAL ENCOUNTER (OUTPATIENT)
Dept: HOSPITAL 75 - SDC | Age: 48
Discharge: HOME | End: 2023-04-12
Attending: SURGERY
Payer: MEDICAID

## 2023-04-12 VITALS — WEIGHT: 294.98 LBS | BODY MASS INDEX: 41.3 KG/M2 | HEIGHT: 70.87 IN

## 2023-04-12 VITALS — DIASTOLIC BLOOD PRESSURE: 60 MMHG | SYSTOLIC BLOOD PRESSURE: 100 MMHG

## 2023-04-12 VITALS — DIASTOLIC BLOOD PRESSURE: 75 MMHG | SYSTOLIC BLOOD PRESSURE: 122 MMHG

## 2023-04-12 VITALS — SYSTOLIC BLOOD PRESSURE: 117 MMHG | DIASTOLIC BLOOD PRESSURE: 77 MMHG

## 2023-04-12 VITALS — SYSTOLIC BLOOD PRESSURE: 117 MMHG | DIASTOLIC BLOOD PRESSURE: 75 MMHG

## 2023-04-12 VITALS — SYSTOLIC BLOOD PRESSURE: 116 MMHG | DIASTOLIC BLOOD PRESSURE: 69 MMHG

## 2023-04-12 VITALS — SYSTOLIC BLOOD PRESSURE: 106 MMHG | DIASTOLIC BLOOD PRESSURE: 67 MMHG

## 2023-04-12 VITALS — DIASTOLIC BLOOD PRESSURE: 74 MMHG | SYSTOLIC BLOOD PRESSURE: 125 MMHG

## 2023-04-12 VITALS — SYSTOLIC BLOOD PRESSURE: 122 MMHG | DIASTOLIC BLOOD PRESSURE: 79 MMHG

## 2023-04-12 VITALS — DIASTOLIC BLOOD PRESSURE: 63 MMHG | SYSTOLIC BLOOD PRESSURE: 109 MMHG

## 2023-04-12 VITALS — DIASTOLIC BLOOD PRESSURE: 92 MMHG | SYSTOLIC BLOOD PRESSURE: 132 MMHG

## 2023-04-12 DIAGNOSIS — F17.210: ICD-10-CM

## 2023-04-12 DIAGNOSIS — Z28.310: ICD-10-CM

## 2023-04-12 DIAGNOSIS — K21.00: ICD-10-CM

## 2023-04-12 DIAGNOSIS — E66.01: ICD-10-CM

## 2023-04-12 DIAGNOSIS — K81.1: ICD-10-CM

## 2023-04-12 DIAGNOSIS — K82.8: Primary | ICD-10-CM

## 2023-04-12 DIAGNOSIS — K31.84: ICD-10-CM

## 2023-04-12 PROCEDURE — 87081 CULTURE SCREEN ONLY: CPT

## 2023-04-12 PROCEDURE — 94010 BREATHING CAPACITY TEST: CPT

## 2023-04-12 PROCEDURE — 76000 FLUOROSCOPY <1 HR PHYS/QHP: CPT

## 2023-04-12 NOTE — DISCHARGE INST-SURGICAL
Discharge Inst-Surgical


Depart Medication/Instructions


New, Converted or Re-Newed RX:  Transmitted to Pharmacy


Patient Instructions


Follow up Appt:


Make appointment for 1 week. [phone]





Instructions:


No lifting greater than 20 pounds.


No strenuous activity. 


May shower in 24 hours, no tub bath or soaking.


Use incentive spirometer at home as directed.


No Smoking





Skin/Wound Care:


May remove bandages in am.  You need to leave the Dermabond on incision it will 

fall off on it's own. 





Symptoms to Report:


Appetite Changes, Extremity Discoloration, Numbness/Tingling, Swelling 

Increased, Bleeding Excessive, Eyesight Changes, Pain Increased, Urine Color 

Change, Constipation(Persistent), Fever over 101 degree F, Pain/Pressure in 

chest, Urinating Difficulty, Cough Up/Vomit Blood, Heart Beat Irreg/Pounding, 

Pain/Pressure in jaw, Cramps in feet or legs, Lightheadedness, Pain/Pressure in 

shoulder, Diarrhea(Persistent), Memory Changes Suddenly, Questions/Concerns, 

Weight gain consecutive days, Dizziness/Fainting, Nausea/Vomiting, Shortness of 

Breath, Weight gain over 2 pounds








If questions or concerns contact your physician 


Or seek help at emergency department.





Activity


Activity as Tolerated:  Yes


Activity Instructions:  Avoid Stress to Incision


Driving Instructions:  No Driving/Refer to 





Diet


Discharge Diet:  Avoid Fatty Foods, Low Fat/Low Cholesterol


Diet for 24 Hours:  No DeBary Foods


Diet After 24 Hours:  Clear Liquid if Nauseous


If Any Problems/Questions/Issu:  Contact Your Physician, Go to Emergency Room





Skin/Wound Care


Infection Signs and Symptoms:  Increased Redness, Foul Odor of Wound, Increased 

Drainage, Skin Itchy or Has a Rash, Increased Swelling, Temperature Above 101  F


Wound Care Comment:  


heating pad to shoulder or neck tonight for pain


Bathing Instructions:  Shower


Stitches/Staples/Dermabond Dis:  Dermabond


Ice Pack:  Ice On and Off Site











DEVIN MARES DO               Apr 12, 2023 11:34

## 2023-04-12 NOTE — ANESTHESIA-GENERAL POST-OP
General


Patient Condition


Mental Status/LOC:  Same as Preop


Cardiovascular:  Satisfactory


Nausea/Vomiting:  Absent


Respiratory:  Satisfactory


Pain:  Controlled


Complications:  Absent





Post Op Complications


Complications


None





Follow Up Care/Instructions


Patient Instructions


None needed.





Anesthesia/Patient Condition


Patient Condition


Patient is doing well, no complaints, stable vital signs, no apparent adverse 

anesthesia problems.   


No complications reported per nursing.











COLE RAMAN CRNA         Apr 12, 2023 11:52

## 2023-04-12 NOTE — PROGRESS NOTE-POST OPERATIVE
Post-Operative Progess Note


Surgeon (s)/Assistant (s)


Surgeon


DEVIN MARES DO


Assistant:  Mervin





Pre-Operative Diagnosis


Biliary Dyskinesia





Post-Operative Diagnosis





same





Procedure & Operative Findings


Date of Procedure


4/12/23


Procedure Performed/Findings


PROCEDURE: Laparoscopic cholecystectomy with intraoperative cholangiogram. 


 


COMPLICATIONS: None. 


 


PROCEDURE:


The patient was taken to the operating suite and was prepped and draped in 


sterile fashion. A surgical pause was performed. Just superior to the umbilicus,




a 12 mm incision was made. Dissection was taken down to the fascia, which was 

then 


scored and grasped with a Kocher and the abdomen was then entered.  A 0 Vicryl


suture was placed in a figure-of-eight fashion and a Baca trocar was placed 

and 


secured. Pneumoperitoneum was achieved. A 5mm trochar place in the subxyphoid an

d 


2 in the right upper quadrant. The gallbladder was then grasped and elevated. 

The


cystic duct, and cystic artery were then dissected out. Clip was placed on the 

distal


portion of the cystic duct which was then partially transected. An arrow 

catheter 


was inserted into the duct. The cholangiogram was then performed. No filling 

defects 


and contrast made its way into the duodenum.  Catheter removed. Clips were 

placed


on proximal portion of the cystic duct and then the duct was then transected. 

Clips 


were placed along the proximal and distal portion of the cystic artery which was




then transected. Hook cautery was used to dissect the gallbladder from the 

gallbladder


fossa achieving hemostasis. The gallbladder was placed in an Endobag and removed




through the 12 mm trocar site. The abdomen was then reinspected.  Copious 

amounts of 


irrigation were used to irrigate the abdomen and there were no signs of active 

bleeding.


Hemostasis had been achieved. The 12 mm fascial defect was then closed with 0 

Vicryl 


suture that had been placed in a figure-of-eight fashion. The abdomen was then 

desufflated, 


the trocars were removed. The abdomen was then washed and dried. The skin was 

then closed 


using 4-0 Monocryl in a subcuticular fashion. The abdomen was washed and dried 

and Skin 


Affix was place over incisions. Patient tolerated the procedure well without any

complications 


and was taken to the recovery room in stable condition.








Dr. Jeffries assisted on this case helping to make incisions, close incisions, 

identify


anatomy and hold anatomy out of the way.


Anesthesia Type


GET





Estimated Blood Loss


Estimated blood loss (mL):  scant





Specimens/Packing


Specimens Removed


GB











DEVIN MARES DO               Apr 12, 2023 11:31

## 2023-04-12 NOTE — DIAGNOSTIC IMAGING REPORT
INDICATION: Epigastric pain.



COMPARISON: None



Total fluoroscopy time: 7.8 seconds



Total number fluoroscopic images saved: 28



FINDINGS: Multiple intraoperative digital subtraction images of

the right upper abdominal quadrant were obtained during

intraoperative cholangiogram. Images provided show contrast

opacifying the intra and extra hepatic biliary ductal systems. No

large intraluminal filling defect is seen. Contrast empties in

the small bowel as expected. Please note, interpreting

radiologist was not present during the procedure.



IMPRESSION:

1. Fluoroscopic guidance provided during intraoperative

cholangiogram as above.



Dictated by: 



  Dictated on workstation # [id number]

## 2023-04-12 NOTE — PROGRESS NOTE-PRE OPERATIVE
Pre-Operative Progress Note


Date of Available H&P:  Mar 28, 2023


Date H&P Reviewed:  Apr 12, 2023


Time H&P Reviewed:  09:28


History & Physical:  H&P Reviewed, Patient Examed, No changes noted


Pre-Operative Diagnosis:  Biliary Dyskinesia











DEVIN MARES DO               Apr 12, 2023 09:28

## 2023-06-10 ENCOUNTER — HOSPITAL ENCOUNTER (OUTPATIENT)
Dept: HOSPITAL 75 - RAD FS | Age: 48
End: 2023-06-10
Attending: NURSE PRACTITIONER
Payer: MEDICAID

## 2023-06-10 DIAGNOSIS — M17.11: Primary | ICD-10-CM

## 2023-06-10 PROCEDURE — 73562 X-RAY EXAM OF KNEE 3: CPT

## 2023-06-10 NOTE — DIAGNOSTIC IMAGING REPORT
CLINICAL INDICATIONS: Patient fell off ladder and has pain above

patella.



EXAM: X-ray of the right knee, 3 views.



COMPARISON: MRI of the right knee dated 02/14/2020.



FINDINGS:

There is no acute fracture or dislocation. Suspected small right

knee effusion. There are mildly hypertrophic spurs in the

tricompartmental regions.



IMPRESSION:

1: There is mild degenerative disease of the right knee with no

acute fracture or dislocation.



2: Suspected small right knee effusion.



Dictated by: 



  Dictated on workstation # [id number]